# Patient Record
Sex: FEMALE | Race: WHITE | NOT HISPANIC OR LATINO | Employment: UNEMPLOYED | URBAN - METROPOLITAN AREA
[De-identification: names, ages, dates, MRNs, and addresses within clinical notes are randomized per-mention and may not be internally consistent; named-entity substitution may affect disease eponyms.]

---

## 2018-11-14 ENCOUNTER — APPOINTMENT (OUTPATIENT)
Dept: GENERAL RADIOLOGY | Facility: HOSPITAL | Age: 58
End: 2018-11-14

## 2018-11-14 ENCOUNTER — HOSPITAL ENCOUNTER (EMERGENCY)
Facility: HOSPITAL | Age: 58
Discharge: HOME | End: 2018-11-14
Attending: EMERGENCY MEDICINE | Admitting: EMERGENCY MEDICINE

## 2018-11-14 VITALS
DIASTOLIC BLOOD PRESSURE: 77 MMHG | TEMPERATURE: 97.7 F | SYSTOLIC BLOOD PRESSURE: 110 MMHG | WEIGHT: 153.6 LBS | BODY MASS INDEX: 28.26 KG/M2 | HEIGHT: 62 IN | RESPIRATION RATE: 18 BRPM | OXYGEN SATURATION: 100 % | HEART RATE: 84 BPM

## 2018-11-14 DIAGNOSIS — R55 NEAR SYNCOPE: Primary | ICD-10-CM

## 2018-11-14 LAB
ALBUMIN SERPL-MCNC: 4 G/DL (ref 3.5–5.2)
ALBUMIN/GLOB SERPL: 1.2 G/DL
ALP SERPL-CCNC: 66 U/L (ref 39–117)
ALT SERPL W P-5'-P-CCNC: 40 U/L (ref 1–33)
ANION GAP SERPL CALCULATED.3IONS-SCNC: 12.5 MMOL/L
AST SERPL-CCNC: 28 U/L (ref 1–32)
BASOPHILS # BLD AUTO: 0.03 10*3/MM3 (ref 0–0.2)
BASOPHILS NFR BLD AUTO: 0.3 % (ref 0–1.5)
BILIRUB SERPL-MCNC: 0.4 MG/DL (ref 0.1–1.2)
BUN BLD-MCNC: 17 MG/DL (ref 6–20)
BUN/CREAT SERPL: 14.5 (ref 7–25)
CALCIUM SPEC-SCNC: 9.7 MG/DL (ref 8.6–10.5)
CHLORIDE SERPL-SCNC: 102 MMOL/L (ref 98–107)
CO2 SERPL-SCNC: 22.5 MMOL/L (ref 22–29)
CREAT BLD-MCNC: 1.17 MG/DL (ref 0.57–1)
D DIMER PPP FEU-MCNC: 0.3 MCGFEU/ML (ref 0–0.49)
DEPRECATED RDW RBC AUTO: 47.4 FL (ref 37–54)
EOSINOPHIL # BLD AUTO: 0.07 10*3/MM3 (ref 0–0.7)
EOSINOPHIL NFR BLD AUTO: 0.6 % (ref 0.3–6.2)
ERYTHROCYTE [DISTWIDTH] IN BLOOD BY AUTOMATED COUNT: 12.3 % (ref 11.7–13)
GFR SERPL CREATININE-BSD FRML MDRD: 48 ML/MIN/1.73
GLOBULIN UR ELPH-MCNC: 3.4 GM/DL
GLUCOSE BLD-MCNC: 148 MG/DL (ref 65–99)
HCT VFR BLD AUTO: 39.8 % (ref 35.6–45.5)
HGB BLD-MCNC: 13 G/DL (ref 11.9–15.5)
IMM GRANULOCYTES # BLD: 0.04 10*3/MM3 (ref 0–0.03)
IMM GRANULOCYTES NFR BLD: 0.4 % (ref 0–0.5)
LYMPHOCYTES # BLD AUTO: 1.02 10*3/MM3 (ref 0.9–4.8)
LYMPHOCYTES NFR BLD AUTO: 9.2 % (ref 19.6–45.3)
MCH RBC QN AUTO: 34.3 PG (ref 26.9–32)
MCHC RBC AUTO-ENTMCNC: 32.7 G/DL (ref 32.4–36.3)
MCV RBC AUTO: 105 FL (ref 80.5–98.2)
MONOCYTES # BLD AUTO: 0.97 10*3/MM3 (ref 0.2–1.2)
MONOCYTES NFR BLD AUTO: 8.8 % (ref 5–12)
NEUTROPHILS # BLD AUTO: 8.96 10*3/MM3 (ref 1.9–8.1)
NEUTROPHILS NFR BLD AUTO: 81.1 % (ref 42.7–76)
PLATELET # BLD AUTO: 174 10*3/MM3 (ref 140–500)
PMV BLD AUTO: 11.9 FL (ref 6–12)
POTASSIUM BLD-SCNC: 3.9 MMOL/L (ref 3.5–5.2)
PROT SERPL-MCNC: 7.4 G/DL (ref 6–8.5)
RBC # BLD AUTO: 3.79 10*6/MM3 (ref 3.9–5.2)
SODIUM BLD-SCNC: 137 MMOL/L (ref 136–145)
TROPONIN T SERPL-MCNC: <0.01 NG/ML (ref 0–0.03)
WBC NRBC COR # BLD: 11.05 10*3/MM3 (ref 4.5–10.7)

## 2018-11-14 PROCEDURE — 96360 HYDRATION IV INFUSION INIT: CPT

## 2018-11-14 PROCEDURE — 84484 ASSAY OF TROPONIN QUANT: CPT | Performed by: EMERGENCY MEDICINE

## 2018-11-14 PROCEDURE — 93005 ELECTROCARDIOGRAM TRACING: CPT | Performed by: EMERGENCY MEDICINE

## 2018-11-14 PROCEDURE — 99284 EMERGENCY DEPT VISIT MOD MDM: CPT

## 2018-11-14 PROCEDURE — 85379 FIBRIN DEGRADATION QUANT: CPT | Performed by: EMERGENCY MEDICINE

## 2018-11-14 PROCEDURE — 36415 COLL VENOUS BLD VENIPUNCTURE: CPT

## 2018-11-14 PROCEDURE — 85025 COMPLETE CBC W/AUTO DIFF WBC: CPT | Performed by: EMERGENCY MEDICINE

## 2018-11-14 PROCEDURE — 80053 COMPREHEN METABOLIC PANEL: CPT | Performed by: EMERGENCY MEDICINE

## 2018-11-14 PROCEDURE — 71045 X-RAY EXAM CHEST 1 VIEW: CPT

## 2018-11-14 PROCEDURE — 93010 ELECTROCARDIOGRAM REPORT: CPT | Performed by: INTERNAL MEDICINE

## 2018-11-14 RX ADMIN — SODIUM CHLORIDE 1000 ML: 9 INJECTION, SOLUTION INTRAVENOUS at 12:56

## 2018-11-14 NOTE — ED PROVIDER NOTES
" EMERGENCY DEPARTMENT ENCOUNTER    Room Number:  16/16  PCP: Fredy Del Cid MD  Historian: Patient, Family      HPI  Chief Complaint: Near-Syncope  Context: Subha Gilbetr is a 58 y.o. female. Pt reports that about 3 weeks ago, pt sustained a torn meniscus of the left knee for which pt is currently being followed by an orthopedist at [facility] and has undergone steroid injections. Pt states that due to her knee injury, pt has been somewhat immobile for the past several weeks. Pt states that she returned to work today. While pt was at work at about 11:45 AM today, pt had a near-syncopal episode in which pt became lightheaded, diaphoretic, and nauseated. Consequently, pt was escorted to a seat by a co-worker and shortly thereafter, pt began vomiting and \"kind of blacked out\". Pt reports that after she vomited, pt's lightheadedness and diaphoresis began to improve and have now resolved. Pt denies sustaining any significant injuries during the near-syncopal episode (including head injury). Pt also denies focal weakness/numbness, speech/visual difficulties, chest pain, dyspnea, palpitations, abdominal pain, headache, and bloody stools. There are no other complaints at this time.         Location: N/A  Radiation: N/A  Character: \"I kind of blacked out\"  Duration: Onset at about 11:45 AM today  Severity: Moderate  Progression: Resolved  Aggravating Factors: Nothing  Alleviating Factors: Nothing      PAST MEDICAL HISTORY  Active Ambulatory Problems     Diagnosis Date Noted   • No Active Ambulatory Problems     Resolved Ambulatory Problems     Diagnosis Date Noted   • No Resolved Ambulatory Problems     No Additional Past Medical History         PAST SURGICAL HISTORY  Past Surgical History:   Procedure Laterality Date   • KNEE SURGERY           FAMILY HISTORY  History reviewed. No pertinent family history.      SOCIAL HISTORY  Social History     Socioeconomic History   • Marital status:      Spouse name: " Not on file   • Number of children: Not on file   • Years of education: Not on file   • Highest education level: Not on file   Social Needs   • Financial resource strain: Not on file   • Food insecurity - worry: Not on file   • Food insecurity - inability: Not on file   • Transportation needs - medical: Not on file   • Transportation needs - non-medical: Not on file   Occupational History   • Not on file   Tobacco Use   • Smoking status: Not on file   Substance and Sexual Activity   • Alcohol use: Not on file   • Drug use: Not on file   • Sexual activity: Not on file   Other Topics Concern   • Not on file   Social History Narrative   • Not on file         ALLERGIES  Penicillins        REVIEW OF SYSTEMS  Review of Systems   Constitutional: Positive for diaphoresis. Negative for chills.   HENT: Negative for congestion, rhinorrhea and sore throat.    Eyes: Negative for pain and visual disturbance.   Respiratory: Negative for cough and shortness of breath.    Cardiovascular: Negative for chest pain, palpitations and leg swelling.   Gastrointestinal: Positive for nausea and vomiting. Negative for abdominal pain, blood in stool and diarrhea.   Genitourinary: Negative for difficulty urinating, dysuria, flank pain and frequency.   Musculoskeletal: Negative for myalgias, neck pain and neck stiffness.   Skin: Negative for rash.   Neurological: Positive for light-headedness. Negative for speech difficulty, weakness, numbness and headaches.   Psychiatric/Behavioral: Negative.    All other systems reviewed and are negative.           PHYSICAL EXAM  ED Triage Vitals [11/14/18 1216]   Temp Heart Rate Resp BP SpO2   96.5 °F (35.8 °C) 102 24 (!) 82/62 93 %      Temp src Heart Rate Source Patient Position BP Location FiO2 (%)   Tympanic -- -- -- --       Physical Exam   Constitutional: She is oriented to person, place, and time. No distress.   HENT:   Head: Normocephalic.   Mouth/Throat: Mucous membranes are normal.   Eyes: EOM are  normal. Pupils are equal, round, and reactive to light.   Neck: Normal range of motion. Neck supple.   Cardiovascular: Normal rate, regular rhythm and normal heart sounds.   Pulmonary/Chest: Effort normal and breath sounds normal. No respiratory distress. She has no decreased breath sounds. She has no wheezes. She has no rhonchi. She has no rales.   Abdominal: Soft. There is no tenderness. There is no rebound and no guarding.   Musculoskeletal: She exhibits tenderness (of the left knee).   Calves are nontender bilaterally.    Neurological: She is alert and oriented to person, place, and time. She has normal sensation.   Skin: Skin is warm and dry.   Psychiatric: Mood and affect normal.   Nursing note and vitals reviewed.          LAB RESULTS  Recent Results (from the past 24 hour(s))   D-dimer, Quantitative    Collection Time: 11/14/18 12:58 PM   Result Value Ref Range    D-Dimer, Quantitative 0.30 0.00 - 0.49 MCGFEU/mL   Comprehensive Metabolic Panel    Collection Time: 11/14/18  1:19 PM   Result Value Ref Range    Glucose 148 (H) 65 - 99 mg/dL    BUN 17 6 - 20 mg/dL    Creatinine 1.17 (H) 0.57 - 1.00 mg/dL    Sodium 137 136 - 145 mmol/L    Potassium 3.9 3.5 - 5.2 mmol/L    Chloride 102 98 - 107 mmol/L    CO2 22.5 22.0 - 29.0 mmol/L    Calcium 9.7 8.6 - 10.5 mg/dL    Total Protein 7.4 6.0 - 8.5 g/dL    Albumin 4.00 3.50 - 5.20 g/dL    ALT (SGPT) 40 (H) 1 - 33 U/L    AST (SGOT) 28 1 - 32 U/L    Alkaline Phosphatase 66 39 - 117 U/L    Total Bilirubin 0.4 0.1 - 1.2 mg/dL    eGFR Non African Amer 48 (L) >60 mL/min/1.73    Globulin 3.4 gm/dL    A/G Ratio 1.2 g/dL    BUN/Creatinine Ratio 14.5 7.0 - 25.0    Anion Gap 12.5 mmol/L   Troponin    Collection Time: 11/14/18  1:19 PM   Result Value Ref Range    Troponin T <0.010 0.000 - 0.030 ng/mL   CBC Auto Differential    Collection Time: 11/14/18  1:19 PM   Result Value Ref Range    WBC 11.05 (H) 4.50 - 10.70 10*3/mm3    RBC 3.79 (L) 3.90 - 5.20 10*6/mm3    Hemoglobin 13.0  11.9 - 15.5 g/dL    Hematocrit 39.8 35.6 - 45.5 %    .0 (H) 80.5 - 98.2 fL    MCH 34.3 (H) 26.9 - 32.0 pg    MCHC 32.7 32.4 - 36.3 g/dL    RDW 12.3 11.7 - 13.0 %    RDW-SD 47.4 37.0 - 54.0 fl    MPV 11.9 6.0 - 12.0 fL    Platelets 174 140 - 500 10*3/mm3    Neutrophil % 81.1 (H) 42.7 - 76.0 %    Lymphocyte % 9.2 (L) 19.6 - 45.3 %    Monocyte % 8.8 5.0 - 12.0 %    Eosinophil % 0.6 0.3 - 6.2 %    Basophil % 0.3 0.0 - 1.5 %    Immature Grans % 0.4 0.0 - 0.5 %    Neutrophils, Absolute 8.96 (H) 1.90 - 8.10 10*3/mm3    Lymphocytes, Absolute 1.02 0.90 - 4.80 10*3/mm3    Monocytes, Absolute 0.97 0.20 - 1.20 10*3/mm3    Eosinophils, Absolute 0.07 0.00 - 0.70 10*3/mm3    Basophils, Absolute 0.03 0.00 - 0.20 10*3/mm3    Immature Grans, Absolute 0.04 (H) 0.00 - 0.03 10*3/mm3       Ordered the above labs and reviewed the results.        RADIOLOGY  XR Chest 1 View   Preliminary Result   No acute process.               Ordered the above noted radiological studies. Reviewed by me in PACS.          PROCEDURES  Procedures      EKG:          EKG time: 13:41  Rhythm/Rate: NSR rate 82  P waves and MO: Normal P waves  QRS, axis: Normal QRS   ST and T waves: Flattened T waves diffusely     Interpreted Contemporaneously by me, independently viewed  No old EKG is available for comparison           MEDICATIONS GIVEN IN ER  Medications   sodium chloride 0.9 % bolus 1,000 mL (1,000 mL Intravenous New Bag 11/14/18 1256)             PROGRESS AND CONSULTS  ED Course as of Nov 14 1452   Wed Nov 14, 2018   1434 2:34 PM  Patient here for near syncope. Sounds vasovagal.  She has been inactive for a few weeks and then was at work and felt lightheaded, nauseated, broke out into a sweat and then became cold and clammy.  States she feels fine now.  Ambulates without difficulty.  Discussed options with patient and she would like to go home.  Understands we cannot test for all causes of syncope here.    [SL]      ED Course User Index  [SL] Chyna  MD Edin        12:39 PM:  Blood work, D-Dimer, CXR, and EKG ordered for further evaluation.     12:42 PM:  IV fluids ordered to hydrate pt.     2:28 PM:  Rechecked pt. Pt's BP is currently 104/77. Per tech, pt ambulated in the ER without significant difficulty. Informed pt that her troponin is negative. D-Dimer is 0.30. Hgb is 13. Suspect that pt may have had a vasovagal near-syncopal/syncopal episode. Pt was informed of the limitations of the studies ordered in the ER. We cannot r/o all possible causes of pt's (near)-syncope in the ER. Pt reports that she understands this and would like to f/u as an outpatient for her symptoms. Pt was strongly advised to f/u with PMD. Strict RTER warnings given. Pt agrees with plan for discharge.         MEDICAL DECISION MAKING      MDM  Number of Diagnoses or Management Options     Amount and/or Complexity of Data Reviewed  Clinical lab tests: reviewed and ordered (Troponin is negative. D-Dimer is 0.30. )  Tests in the radiology section of CPT®: ordered and reviewed (CXR:  No acute process.)  Tests in the medicine section of CPT®: reviewed and ordered (EKG interpreted.   )    Patient Progress  Patient progress: stable             DIAGNOSIS  Final diagnoses:   Near syncope           DISPOSITION  Pt discharged.    DISCHARGE    Patient discharged in stable condition.    Reviewed implications of results, diagnosis, meds, responsibility to follow up, warning signs and symptoms of possible worsening, potential complications and reasons to return to ER.    Patient/Family voiced understanding of above instructions.    Discussed plan for discharge, as there is no emergent indication for admission. Pt/family is agreeable and understands need for follow up and repeat testing. Pt is aware that discharge does not mean that nothing is wrong but it indicates no emergency is present that requires admission and they must continue care with follow-up as given below or physician of their choice.      FOLLOW-UP  Fredy Del Cid MD  [address] [facility] [address]  [phone]    Schedule an appointment as soon as possible for a visit           Latest Documented Vital Signs:  As of 2:51 PM  BP- 104/77 HR- 80 Temp- 96.5 °F (35.8 °C) (Tympanic) O2 sat- 100%        --  Documentation assistance provided by vera Candelario for Dr. Chyna MD.  Information recorded by the scribe was done at my direction and has been verified and validated by me.           Rufina Candelario  11/14/18 [id number]       Edin Clemente MD  11/14/18 [id number]

## 2021-06-18 ENCOUNTER — APPOINTMENT (OUTPATIENT)
Dept: GENERAL RADIOLOGY | Facility: HOSPITAL | Age: 61
End: 2021-06-18

## 2021-06-18 ENCOUNTER — HOSPITAL ENCOUNTER (OUTPATIENT)
Facility: HOSPITAL | Age: 61
Setting detail: OBSERVATION
Discharge: HOME | End: 2021-06-20
Attending: EMERGENCY MEDICINE | Admitting: HOSPITALIST

## 2021-06-18 ENCOUNTER — APPOINTMENT (OUTPATIENT)
Dept: CT IMAGING | Facility: HOSPITAL | Age: 61
End: 2021-06-18

## 2021-06-18 DIAGNOSIS — E87.6 HYPOKALEMIA: ICD-10-CM

## 2021-06-18 DIAGNOSIS — R94.31 PROLONGED Q-T INTERVAL ON ECG: ICD-10-CM

## 2021-06-18 DIAGNOSIS — R55 SYNCOPE AND COLLAPSE: Primary | ICD-10-CM

## 2021-06-18 LAB
ALBUMIN SERPL-MCNC: 4.5 G/DL (ref 3.5–5.2)
ALBUMIN/GLOB SERPL: 1.3 G/DL
ALP SERPL-CCNC: 74 U/L (ref 39–117)
ALT SERPL W P-5'-P-CCNC: 48 U/L (ref 1–33)
ANION GAP SERPL CALCULATED.3IONS-SCNC: 18.9 MMOL/L (ref 5–15)
AST SERPL-CCNC: 57 U/L (ref 1–32)
BASOPHILS # BLD AUTO: 0.07 10*3/MM3 (ref 0–0.2)
BASOPHILS NFR BLD AUTO: 0.7 % (ref 0–1.5)
BILIRUB SERPL-MCNC: 0.8 MG/DL (ref 0–1.2)
BUN SERPL-MCNC: 17 MG/DL (ref 8–23)
BUN/CREAT SERPL: 15.2 (ref 7–25)
CALCIUM SPEC-SCNC: 9.7 MG/DL (ref 8.6–10.5)
CHLORIDE SERPL-SCNC: 100 MMOL/L (ref 98–107)
CO2 SERPL-SCNC: 19.1 MMOL/L (ref 22–29)
CREAT SERPL-MCNC: 1.12 MG/DL (ref 0.57–1)
DEPRECATED RDW RBC AUTO: 46.9 FL (ref 37–54)
EOSINOPHIL # BLD AUTO: 0.1 10*3/MM3 (ref 0–0.4)
EOSINOPHIL NFR BLD AUTO: 1.1 % (ref 0.3–6.2)
ERYTHROCYTE [DISTWIDTH] IN BLOOD BY AUTOMATED COUNT: 12.6 % (ref 12.3–15.4)
GFR SERPL CREATININE-BSD FRML MDRD: 50 ML/MIN/1.73
GLOBULIN UR ELPH-MCNC: 3.4 GM/DL
GLUCOSE BLDC GLUCOMTR-MCNC: 174 MG/DL (ref 70–130)
GLUCOSE SERPL-MCNC: 177 MG/DL (ref 65–99)
HCT VFR BLD AUTO: 37.7 % (ref 34–46.6)
HGB BLD-MCNC: 12.6 G/DL (ref 12–15.9)
IMM GRANULOCYTES # BLD AUTO: 0.04 10*3/MM3 (ref 0–0.05)
IMM GRANULOCYTES NFR BLD AUTO: 0.4 % (ref 0–0.5)
LYMPHOCYTES # BLD AUTO: 2.03 10*3/MM3 (ref 0.7–3.1)
LYMPHOCYTES NFR BLD AUTO: 21.6 % (ref 19.6–45.3)
MAGNESIUM SERPL-MCNC: 1.6 MG/DL (ref 1.6–2.4)
MCH RBC QN AUTO: 33.6 PG (ref 26.6–33)
MCHC RBC AUTO-ENTMCNC: 33.4 G/DL (ref 31.5–35.7)
MCV RBC AUTO: 100.5 FL (ref 79–97)
MONOCYTES # BLD AUTO: 0.78 10*3/MM3 (ref 0.1–0.9)
MONOCYTES NFR BLD AUTO: 8.3 % (ref 5–12)
NEUTROPHILS NFR BLD AUTO: 6.38 10*3/MM3 (ref 1.7–7)
NEUTROPHILS NFR BLD AUTO: 67.9 % (ref 42.7–76)
NRBC BLD AUTO-RTO: 0 /100 WBC (ref 0–0.2)
NT-PROBNP SERPL-MCNC: 184.5 PG/ML (ref 0–900)
PLATELET # BLD AUTO: 106 10*3/MM3 (ref 140–450)
PMV BLD AUTO: 12 FL (ref 6–12)
POTASSIUM SERPL-SCNC: 3 MMOL/L (ref 3.5–5.2)
PROT SERPL-MCNC: 7.9 G/DL (ref 6–8.5)
RBC # BLD AUTO: 3.75 10*6/MM3 (ref 3.77–5.28)
SARS-COV-2 RNA RESP QL NAA+PROBE: NOT DETECTED
SODIUM SERPL-SCNC: 138 MMOL/L (ref 136–145)
TROPONIN T SERPL-MCNC: <0.01 NG/ML (ref 0–0.03)
WBC # BLD AUTO: 9.4 10*3/MM3 (ref 3.4–10.8)

## 2021-06-18 PROCEDURE — U0003 INFECTIOUS AGENT DETECTION BY NUCLEIC ACID (DNA OR RNA); SEVERE ACUTE RESPIRATORY SYNDROME CORONAVIRUS 2 (SARS-COV-2) (CORONAVIRUS DISEASE [COVID-19]), AMPLIFIED PROBE TECHNIQUE, MAKING USE OF HIGH THROUGHPUT TECHNOLOGIES AS DESCRIBED BY CMS-2020-01-R: HCPCS | Performed by: EMERGENCY MEDICINE

## 2021-06-18 PROCEDURE — 96361 HYDRATE IV INFUSION ADD-ON: CPT

## 2021-06-18 PROCEDURE — 93005 ELECTROCARDIOGRAM TRACING: CPT | Performed by: EMERGENCY MEDICINE

## 2021-06-18 PROCEDURE — 71045 X-RAY EXAM CHEST 1 VIEW: CPT

## 2021-06-18 PROCEDURE — 83880 ASSAY OF NATRIURETIC PEPTIDE: CPT | Performed by: EMERGENCY MEDICINE

## 2021-06-18 PROCEDURE — G0378 HOSPITAL OBSERVATION PER HR: HCPCS

## 2021-06-18 PROCEDURE — 96365 THER/PROPH/DIAG IV INF INIT: CPT

## 2021-06-18 PROCEDURE — 84484 ASSAY OF TROPONIN QUANT: CPT | Performed by: EMERGENCY MEDICINE

## 2021-06-18 PROCEDURE — 85025 COMPLETE CBC W/AUTO DIFF WBC: CPT | Performed by: EMERGENCY MEDICINE

## 2021-06-18 PROCEDURE — 25010000002 MAGNESIUM SULFATE 2 GM/50ML SOLUTION: Performed by: EMERGENCY MEDICINE

## 2021-06-18 PROCEDURE — 99285 EMERGENCY DEPT VISIT HI MDM: CPT

## 2021-06-18 PROCEDURE — 93010 ELECTROCARDIOGRAM REPORT: CPT | Performed by: INTERNAL MEDICINE

## 2021-06-18 PROCEDURE — 83735 ASSAY OF MAGNESIUM: CPT | Performed by: EMERGENCY MEDICINE

## 2021-06-18 PROCEDURE — 70450 CT HEAD/BRAIN W/O DYE: CPT

## 2021-06-18 PROCEDURE — 80053 COMPREHEN METABOLIC PANEL: CPT | Performed by: EMERGENCY MEDICINE

## 2021-06-18 PROCEDURE — 25810000003 SODIUM CHLORIDE 0.9 % WITH KCL 20 MEQ 20-0.9 MEQ/L-% SOLUTION: Performed by: HOSPITALIST

## 2021-06-18 PROCEDURE — C9803 HOPD COVID-19 SPEC COLLECT: HCPCS

## 2021-06-18 PROCEDURE — 82962 GLUCOSE BLOOD TEST: CPT

## 2021-06-18 RX ORDER — POTASSIUM CHLORIDE 750 MG/1
40 TABLET, FILM COATED, EXTENDED RELEASE ORAL ONCE
Status: DISCONTINUED | OUTPATIENT
Start: 2021-06-18 | End: 2021-06-18

## 2021-06-18 RX ORDER — PANTOPRAZOLE SODIUM 40 MG/1
40 TABLET, DELAYED RELEASE ORAL
Status: DISCONTINUED | OUTPATIENT
Start: 2021-06-19 | End: 2021-06-20 | Stop reason: HOSPADM

## 2021-06-18 RX ORDER — ATORVASTATIN CALCIUM 20 MG/1
40 TABLET, FILM COATED ORAL NIGHTLY
Status: DISCONTINUED | OUTPATIENT
Start: 2021-06-18 | End: 2021-06-20 | Stop reason: HOSPADM

## 2021-06-18 RX ORDER — SODIUM CHLORIDE 0.9 % (FLUSH) 0.9 %
10 SYRINGE (ML) INJECTION AS NEEDED
Status: DISCONTINUED | OUTPATIENT
Start: 2021-06-18 | End: 2021-06-20 | Stop reason: HOSPADM

## 2021-06-18 RX ORDER — ASPIRIN 81 MG/1
81 TABLET, CHEWABLE ORAL DAILY
Status: DISCONTINUED | OUTPATIENT
Start: 2021-06-18 | End: 2021-06-20 | Stop reason: HOSPADM

## 2021-06-18 RX ORDER — MAGNESIUM SULFATE HEPTAHYDRATE 40 MG/ML
2 INJECTION, SOLUTION INTRAVENOUS ONCE
Status: DISCONTINUED | OUTPATIENT
Start: 2021-06-18 | End: 2021-06-18

## 2021-06-18 RX ORDER — ACETAMINOPHEN 325 MG/1
650 TABLET ORAL EVERY 4 HOURS PRN
Status: DISCONTINUED | OUTPATIENT
Start: 2021-06-18 | End: 2021-06-20

## 2021-06-18 RX ORDER — SODIUM CHLORIDE AND POTASSIUM CHLORIDE 150; 900 MG/100ML; MG/100ML
75 INJECTION, SOLUTION INTRAVENOUS CONTINUOUS
Status: DISCONTINUED | OUTPATIENT
Start: 2021-06-18 | End: 2021-06-20

## 2021-06-18 RX ORDER — POTASSIUM CHLORIDE 750 MG/1
40 TABLET, FILM COATED, EXTENDED RELEASE ORAL ONCE
Status: COMPLETED | OUTPATIENT
Start: 2021-06-18 | End: 2021-06-18

## 2021-06-18 RX ORDER — MAGNESIUM SULFATE HEPTAHYDRATE 40 MG/ML
2 INJECTION, SOLUTION INTRAVENOUS ONCE
Status: COMPLETED | OUTPATIENT
Start: 2021-06-18 | End: 2021-06-18

## 2021-06-18 RX ADMIN — ASPIRIN 81 MG: 81 TABLET, CHEWABLE ORAL at 21:49

## 2021-06-18 RX ADMIN — ATORVASTATIN CALCIUM 40 MG: 20 TABLET, FILM COATED ORAL at 21:49

## 2021-06-18 RX ADMIN — POTASSIUM CHLORIDE AND SODIUM CHLORIDE 125 ML/HR: 900; 150 INJECTION, SOLUTION INTRAVENOUS at 18:26

## 2021-06-18 RX ADMIN — MAGNESIUM SULFATE HEPTAHYDRATE 2 G: 2 INJECTION, SOLUTION INTRAVENOUS at 14:07

## 2021-06-18 RX ADMIN — SODIUM CHLORIDE, POTASSIUM CHLORIDE, SODIUM LACTATE AND CALCIUM CHLORIDE 1000 ML: 600; 310; 30; 20 INJECTION, SOLUTION INTRAVENOUS at 12:03

## 2021-06-18 RX ADMIN — POTASSIUM CHLORIDE 40 MEQ: 750 TABLET, EXTENDED RELEASE ORAL at 14:08

## 2021-06-18 NOTE — H&P
"History and physical    Primary care physician  Dr. CHARLES    Chief complaint  Syncopal episode    History of present illness  60-year-old white female with no medical problem home medication brought to the emergency room after syncopal episode while she was at work. Patient was feeling dizzy and lightheaded but no other complaints. Patient does not recall what happened. At the time of interview she is fully alert oriented with some headache as she hit her head on the left side and back. Patient has no loss of control of bowel bladder or tongue biting. Patient has no focal weakness but looks puffy on the left face. Patient admitted for management.    PAST MEDICAL HISTORY   Unremarkable      PAST SURGICAL HISTORY              Procedure Laterality Date   • KNEE SURGERY             FAMILY HISTORY  No family history on file.     SOCIAL HISTORY                 Socioeconomic History   • Marital status:        Spouse name: Not on file   • Number of children: Not on file   • Years of education: Not on file   • Highest education level: Not on file         ALLERGIES  Penicillins  No home medications     REVIEW OF SYSTEMS  Review of all 14 systems is negative other than stated in the HPI above.     PHYSICAL EXAM  Blood pressure 115/75, pulse 74, temperature 98.6 °F (37 °C), temperature source Oral, resp. rate 16, height 157.5 cm (62\"), weight 73.4 kg (161 lb 14.4 oz), SpO2 96 %.    GENERAL: Awake and alert, no acute distress  HENT: nares patent, no palpable skull fracture or scalp hematoma  EYES: no scleral icterus, pupils 3 mm reactive bilaterally, EOMI  CV: regular rhythm, normal rate, no murmur  RESPIRATORY: normal effort, lungs clear auscultation bilaterally  ABDOMEN: soft, nondistended, nontender throughout  MUSCULOSKELETAL: no deformity, no point tenderness throughout bilateral upper or lower extremities  NEURO: alert, fully oriented, moves all extremities, follows commands.  Normal strength in bilateral upper " extremities with no drift.  Normal strength in bilateral lower extremities.  Normal sensation to light touch throughout all extremities.  Speech is fluent.  PSYCH:  calm, cooperative  SKIN: warm, dry     LAB RESULTS  Lab Results (last 24 hours)     Procedure Component Value Units Date/Time    COVID PRE-OP / PRE-PROCEDURE SCREENING ORDER (NO ISOLATION) - Swab, Nasopharynx [[id number]]  (Normal) Collected: 06/18/21 1409    Specimen: Swab from Nasopharynx Updated: 06/18/21 1537    Narrative:      The following orders were created for panel order COVID PRE-OP / PRE-PROCEDURE SCREENING ORDER (NO ISOLATION) - Swab, Nasopharynx.  Procedure                               Abnormality         Status                     ---------                               -----------         ------                     COVID-19,[facility]-HOUSE...[[id number]]  Normal              Final result                 Please view results for these tests on the individual orders.    COVID-19,[facility] CEPHEID/JOSÉ MIGUEL NP SWAB IN TRANSPORT MEDIA 8-12 HR TAT - Swab, Nasopharynx [[id number]]  (Normal) Collected: 06/18/21 1409    Specimen: Swab from Nasopharynx Updated: 06/18/21 1537     COVID19 Not Detected    Narrative:      Fact sheet for providers: https://www.fda.gov/media/527889/download     Fact sheet for patients: https://www.fda.gov/media/804135/download    Magnesium [[id number]]  (Normal) Collected: 06/18/21 1202    Specimen: Blood Updated: 06/18/21 1435     Magnesium 1.6 mg/dL     Comprehensive Metabolic Panel [[id number]]  (Abnormal) Collected: 06/18/21 1202    Specimen: Blood Updated: 06/18/21 1313     Glucose 177 mg/dL      BUN 17 mg/dL      Creatinine 1.12 mg/dL      Sodium 138 mmol/L      Potassium 3.0 mmol/L      Chloride 100 mmol/L      CO2 19.1 mmol/L      Calcium 9.7 mg/dL      Total Protein 7.9 g/dL      Albumin 4.50 g/dL      ALT (SGPT) 48 U/L      AST (SGOT) 57 U/L      Alkaline Phosphatase 74 U/L      Total Bilirubin 0.8 mg/dL      eGFR  Non African Amer 50 mL/min/1.73      Globulin 3.4 gm/dL      A/G Ratio 1.3 g/dL      BUN/Creatinine Ratio 15.2     Anion Gap 18.9 mmol/L     Narrative:      GFR Normal >60  Chronic Kidney Disease <60  Kidney Failure <15      Troponin [[id number]]  (Normal) Collected: 06/18/21 1202    Specimen: Blood Updated: 06/18/21 1250     Troponin T <0.010 ng/mL     Narrative:      Troponin T Reference Range:  <= 0.03 ng/mL-   Negative for AMI  >0.03 ng/mL-     Abnormal for myocardial necrosis.  Clinicians would have to utilize clinical acumen, EKG, Troponin and serial changes to determine if it is an Acute Myocardial Infarction or myocardial injury due to an underlying chronic condition.       Results may be falsely decreased if patient taking Biotin.      BNP [[id number]]  (Normal) Collected: 06/18/21 1202    Specimen: Blood Updated: 06/18/21 1247     proBNP 184.5 pg/mL     Narrative:      Among patients with dyspnea, NT-proBNP is highly sensitive for the detection of acute congestive heart failure. In addition NT-proBNP of <300 pg/ml effectively rules out acute congestive heart failure with 99% negative predictive value.    Results may be falsely decreased if patient taking Biotin.      CBC & Differential [[id number]]  (Abnormal) Collected: 06/18/21 1202    Specimen: Blood Updated: 06/18/21 1224    Narrative:      The following orders were created for panel order CBC & Differential.  Procedure                               Abnormality         Status                     ---------                               -----------         ------                     CBC Auto Differential[[id number]]        Abnormal            Final result                 Please view results for these tests on the individual orders.    CBC Auto Differential [[id number]]  (Abnormal) Collected: 06/18/21 1202    Specimen: Blood Updated: 06/18/21 1224     WBC 9.40 10*3/mm3      RBC 3.75 10*6/mm3      Hemoglobin 12.6 g/dL      Hematocrit 37.7 %      .5 fL       MCH 33.6 pg      MCHC 33.4 g/dL      RDW 12.6 %      RDW-SD 46.9 fl      MPV 12.0 fL      Platelets 106 10*3/mm3      Neutrophil % 67.9 %      Lymphocyte % 21.6 %      Monocyte % 8.3 %      Eosinophil % 1.1 %      Basophil % 0.7 %      Immature Grans % 0.4 %      Neutrophils, Absolute 6.38 10*3/mm3      Lymphocytes, Absolute 2.03 10*3/mm3      Monocytes, Absolute 0.78 10*3/mm3      Eosinophils, Absolute 0.10 10*3/mm3      Basophils, Absolute 0.07 10*3/mm3      Immature Grans, Absolute 0.04 10*3/mm3      nRBC 0.0 /100 WBC     POC Glucose Once [[id number]]  (Abnormal) Collected: 06/18/21 1159    Specimen: Blood Updated: 06/18/21 1200     Glucose 174 mg/dL         Imaging Results (Last 24 Hours)     Procedure Component Value Units Date/Time    CT Head Without Contrast [[id number]] Collected: 06/18/21 1231     Updated: 06/18/21 1307    Narrative:      CT HEAD WO CONTRAST-     CLINICAL HISTORY: Syncope. Left occipital head trauma.     TECHNIQUE: Transverse 3 mm thick images were obtained from the base of  the skull to the vertex without IV contrast.     Radiation dose reduction techniques were utilized, including automated  exposure control and exposure modulation based on body size.     COMPARISON: None     FINDINGS: There is mild diffuse cortical atrophy that is most prominent  in the frontal lobes. There is patchy ill-defined diminished attenuation  in the periventricular white matter of both cerebral hemispheres that is  most likely sequela of mild to moderate small vessel chronic ischemic  change. There is no evidence of acute infarct or hemorrhage. There is no  mass effect. Bone window images demonstrate no skull fractures. The  paranasal sinuses and mastoid air cells and middle ear cavities appear  well aerated.       Impression:      Mild cortical atrophy and evidence of mild to moderate small  vessel chronic ischemic change as described. No acute intracranial  abnormality is identified.     This report was  finalized on 6/18/2021 12:35 PM by Dr. Luis Armando Breaux M.D.       XR Chest 1 View [[id number]] Collected: 06/18/21 1241     Updated: 06/18/21 1245    Narrative:      XR CHEST 1 VW-     HISTORY: Female who is 60 years-old,  syncope     TECHNIQUE: Frontal view of the chest     COMPARISON: 11/14/2018     FINDINGS: Heart, mediastinum and pulmonary vasculature are unremarkable.  No focal pulmonary consolidation, pleural effusion, or pneumothorax. No  acute osseous process.       Impression:      No evidence for acute pulmonary process. Follow-up as  clinical indications persist.     This report was finalized on 6/18/2021 12:42 PM by Dr. Chapincito Santos M.D.              ECG 12 Lead  Component   Ref Range & Units 6/18/21 1237   QT Interval   ms 451 P           HEART RATE= 71  bpm  RR Interval= 844  ms  WA Interval= 159  ms  P Horizontal Axis= 17  deg  P Front Axis= 48  deg  QRSD Interval= 96  ms  QT Interval= 451  ms  QRS Axis= -23  deg  T Wave Axis= 26  deg  - BORDERLINE ECG -  Sinus rhythm  Borderline left axis deviation  Borderline prolonged QT interval               Current Facility-Administered Medications:   •  [COMPLETED] Insert peripheral IV, , , Once **AND** sodium chloride 0.9 % flush 10 mL, 10 mL, Intravenous, PRN, Pedro Judd MD  •  sodium chloride 0.9 % with KCl 20 mEq/L infusion, 75 mL/hr, Intravenous, Continuous, Johnny Trammell MD  No current outpatient medications on file.     ASSESSMENT  Syncopal episode questionable etiology  Dehydration  Hypokalemia  Left facial droop rule out TIA    PLAN  Admit  IVF  Neurochecks every 4 hours  Check orthostasis  Aspirin Lipitor  Neuro consult  Cardiology to follow patient  Stress ulcer DVT prophylaxis  Supportive care  Patient is full code  Discussed with nursing staff  Follow closely with recommendation current hospital course    JOHNNY TRAMMELL MD

## 2021-06-18 NOTE — ED PROVIDER NOTES
EMERGENCY DEPARTMENT ENCOUNTER    Room Number:  12/12  Date seen:  6/18/2021  PCP: Fredy Del Cid MD  Historian: Patient, EMS      HPI:  Chief Complaint: Syncope  A complete HPI/ROS/PMH/PSH/SH/FH are unobtainable due to: Nothing  Context: Subha Gilbert is a 60 y.o. female who presents to the ED c/o syncopal episode that occurred while at work today.  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  She did hit her head on the left side and the back.  She denies headache currently but does have some mild pain over the left posterior scalp.  EMS reports that blood sugar was slightly elevated prior to arrival, normal blood pressure and heart rate.  They report that she had slightly unequal pupils initially and also appeared to have some mild left facial droop and left hand  weakness.  Patient denies history of seizure.  She denies frequent alcohol use.  She denies urinary symptoms.  She has not been feeling well the last couple of days and has had watery diarrhea.  She denies black or bloody stool.  No chest pain or shortness of air.  She takes no blood thinner medications.            PAST MEDICAL HISTORY  Active Ambulatory Problems     Diagnosis Date Noted   • No Active Ambulatory Problems     Resolved Ambulatory Problems     Diagnosis Date Noted   • No Resolved Ambulatory Problems     No Additional Past Medical History         PAST SURGICAL HISTORY  Past Surgical History:   Procedure Laterality Date   • KNEE SURGERY           FAMILY HISTORY  No family history on file.      SOCIAL HISTORY  Social History     Socioeconomic History   • Marital status:      Spouse name: Not on file   • Number of children: Not on file   • Years of education: Not on file   • Highest education level: Not on file         ALLERGIES  Penicillins        REVIEW OF SYSTEMS  Review of Systems   Review of all 14 systems is negative other than stated in the HPI  above.      PHYSICAL EXAM  ED Triage Vitals   Temp Heart Rate Resp BP SpO2   -- 06/18/21 1158 06/18/21 1158 06/18/21 1159 06/18/21 1158    77 16 115/83 97 %      Temp src Heart Rate Source Patient Position BP Location FiO2 (%)   -- -- -- -- --                GENERAL: Awake and alert, no acute distress  HENT: nares patent, no palpable skull fracture or scalp hematoma  EYES: no scleral icterus, pupils 3 mm reactive bilaterally, EOMI  CV: regular rhythm, normal rate, no murmur  RESPIRATORY: normal effort, lungs clear auscultation bilaterally  ABDOMEN: soft, nondistended, nontender throughout  MUSCULOSKELETAL: no deformity, no point tenderness throughout bilateral upper or lower extremities  NEURO: alert, fully oriented, moves all extremities, follows commands.  Normal strength in bilateral upper extremities with no drift.  Normal strength in bilateral lower extremities.  Normal sensation to light touch throughout all extremities.  Speech is fluent.  PSYCH:  calm, cooperative  SKIN: warm, dry    Vital signs and nursing notes reviewed.          LAB RESULTS  Recent Results (from the past 24 hour(s))   POC Glucose Once    Collection Time: 06/18/21 11:59 AM    Specimen: Blood   Result Value Ref Range    Glucose 174 (H) 70 - 130 mg/dL   Comprehensive Metabolic Panel    Collection Time: 06/18/21 12:02 PM    Specimen: Blood   Result Value Ref Range    Glucose 177 (H) 65 - 99 mg/dL    BUN 17 8 - 23 mg/dL    Creatinine 1.12 (H) 0.57 - 1.00 mg/dL    Sodium 138 136 - 145 mmol/L    Potassium 3.0 (L) 3.5 - 5.2 mmol/L    Chloride 100 98 - 107 mmol/L    CO2 19.1 (L) 22.0 - 29.0 mmol/L    Calcium 9.7 8.6 - 10.5 mg/dL    Total Protein 7.9 6.0 - 8.5 g/dL    Albumin 4.50 3.50 - 5.20 g/dL    ALT (SGPT) 48 (H) 1 - 33 U/L    AST (SGOT) 57 (H) 1 - 32 U/L    Alkaline Phosphatase 74 39 - 117 U/L    Total Bilirubin 0.8 0.0 - 1.2 mg/dL    eGFR Non African Amer 50 (L) >60 mL/min/1.73    Globulin 3.4 gm/dL    A/G Ratio 1.3 g/dL    BUN/Creatinine  Ratio 15.2 7.0 - 25.0    Anion Gap 18.9 (H) 5.0 - 15.0 mmol/L   BNP    Collection Time: 06/18/21 12:02 PM    Specimen: Blood   Result Value Ref Range    proBNP 184.5 0.0 - 900.0 pg/mL   Troponin    Collection Time: 06/18/21 12:02 PM    Specimen: Blood   Result Value Ref Range    Troponin T <0.010 0.000 - 0.030 ng/mL   CBC Auto Differential    Collection Time: 06/18/21 12:02 PM    Specimen: Blood   Result Value Ref Range    WBC 9.40 3.40 - 10.80 10*3/mm3    RBC 3.75 (L) 3.77 - 5.28 10*6/mm3    Hemoglobin 12.6 12.0 - 15.9 g/dL    Hematocrit 37.7 34.0 - 46.6 %    .5 (H) 79.0 - 97.0 fL    MCH 33.6 (H) 26.6 - 33.0 pg    MCHC 33.4 31.5 - 35.7 g/dL    RDW 12.6 12.3 - 15.4 %    RDW-SD 46.9 37.0 - 54.0 fl    MPV 12.0 6.0 - 12.0 fL    Platelets 106 (L) 140 - 450 10*3/mm3    Neutrophil % 67.9 42.7 - 76.0 %    Lymphocyte % 21.6 19.6 - 45.3 %    Monocyte % 8.3 5.0 - 12.0 %    Eosinophil % 1.1 0.3 - 6.2 %    Basophil % 0.7 0.0 - 1.5 %    Immature Grans % 0.4 0.0 - 0.5 %    Neutrophils, Absolute 6.38 1.70 - 7.00 10*3/mm3    Lymphocytes, Absolute 2.03 0.70 - 3.10 10*3/mm3    Monocytes, Absolute 0.78 0.10 - 0.90 10*3/mm3    Eosinophils, Absolute 0.10 0.00 - 0.40 10*3/mm3    Basophils, Absolute 0.07 0.00 - 0.20 10*3/mm3    Immature Grans, Absolute 0.04 0.00 - 0.05 10*3/mm3    nRBC 0.0 0.0 - 0.2 /100 WBC   Magnesium    Collection Time: 06/18/21 12:02 PM    Specimen: Blood   Result Value Ref Range    Magnesium 1.6 1.6 - 2.4 mg/dL   ECG 12 Lead    Collection Time: 06/18/21 12:37 PM   Result Value Ref Range    QT Interval 451 ms       Ordered the above labs and reviewed the results.        RADIOLOGY  CT Head Without Contrast    Result Date: 6/18/2021  CT HEAD WO CONTRAST-  CLINICAL HISTORY: Syncope. Left occipital head trauma.  TECHNIQUE: Transverse 3 mm thick images were obtained from the base of the skull to the vertex without IV contrast.  Radiation dose reduction techniques were utilized, including automated exposure control  and exposure modulation based on body size.  COMPARISON: None  FINDINGS: There is mild diffuse cortical atrophy that is most prominent in the frontal lobes. There is patchy ill-defined diminished attenuation in the periventricular white matter of both cerebral hemispheres that is most likely sequela of mild to moderate small vessel chronic ischemic change. There is no evidence of acute infarct or hemorrhage. There is no mass effect. Bone window images demonstrate no skull fractures. The paranasal sinuses and mastoid air cells and middle ear cavities appear well aerated.      Mild cortical atrophy and evidence of mild to moderate small vessel chronic ischemic change as described. No acute intracranial abnormality is identified.  This report was finalized on 6/18/2021 12:35 PM by Dr. Luis Armando Breaux M.D.      XR Chest 1 View    Result Date: 6/18/2021  XR CHEST 1 VW-  HISTORY: Female who is 60 years-old,  syncope  TECHNIQUE: Frontal view of the chest  COMPARISON: 11/14/2018  FINDINGS: Heart, mediastinum and pulmonary vasculature are unremarkable. No focal pulmonary consolidation, pleural effusion, or pneumothorax. No acute osseous process.      No evidence for acute pulmonary process. Follow-up as clinical indications persist.  This report was finalized on 6/18/2021 12:42 PM by Dr. Chapincito Santos M.D.        Ordered the above noted radiological studies. Reviewed by me in PACS.            PROCEDURES  Procedures            MEDICATIONS GIVEN IN ER  Medications   sodium chloride 0.9 % flush 10 mL (has no administration in time range)   lactated ringers bolus 1,000 mL (0 mL Intravenous Stopped 6/18/21 1406)   potassium chloride (K-DUR,KLOR-CON) ER tablet 40 mEq (40 mEq Oral Given 6/18/21 1408)   magnesium sulfate 2g/50 mL (PREMIX) infusion (0 g Intravenous Stopped 6/18/21 1436)                   MEDICAL DECISION MAKING, PROGRESS, and CONSULTS    All labs have been independently reviewed by me.  All radiology studies have  been reviewed by me and discussed with radiologist dictating the report.   EKG's independently viewed and interpreted by me.  Discussion below represents my analysis of pertinent findings related to patient's condition, differential diagnosis, treatment plan and final disposition.      Differential diagnosis includes but is not limited to:  Cardiac arrhythmia  Traumatic intracranial hemorrhage  Vasovagal syncope  Vertebrobasilar insufficiency  Hypovolemia secondary to diarrhea    ED Course as of Jun 18 1507   Fri Jun 18, 2021   1204 Patient with syncopal episode prior to arrival with secondary head trauma.  She currently has no focal neurologic deficit.  I have ordered CT head without contrast to evaluate for traumatic intracranial hemorrhage or skull fracture.  I suspect that she is slightly hypovolemic given initiation of new blood pressure medication on Monday and also diarrhea for the last 2 days.  I ordered IV fluids.    [JR]   1255 EKG          EKG time: 1237  Rhythm/Rate: Sinus rhythm, 71  P waves and VT: Normal  QRS, axis: Borderline left axis  ST and T waves: Borderline QT prolongation    Interpreted Contemporaneously by me, independently viewed  Similar compared to prior 11/14/2018          [JR]   1256 I discussed the CT head with Dr. Santos, radiologist who reports no acute intracranial findings.    [JR]   1334 Patient is mildly hypokalemic.  Have ordered a serum magnesium level but have also ordered 2 g magnesium sulfate empirically in addition to 40 mEq oral potassium.  Given her syncopal event with borderline QT prolongation on EKG I will admit her for further monitoring, potassium replacement.    [JR]   1442 Discussed with Dr. Trammell who agrees to admit.    [JR]      ED Course User Index  [JR] Pedro Judd MD     Patient be admitted for further cardiac monitoring given borderline QT prolongation and hypokalemia.  I think that her symptoms are most likely multifactorial and partly related to  2 days of diarrhea preceding this.  Hemoglobin is normal therefore I do not suspect GI bleed.  She has been given IV fluids.  She is beginning to feel better.         I wore a mask, face shield, and gloves during this patient encounter.  Patient also wearing a surgical mask.  Hand hygeine performed before and after seeing the patient.    DIAGNOSIS  Final diagnoses:   Syncope and collapse   Hypokalemia   Prolonged Q-T interval on ECG         DISPOSITION  ADMIT            Latest Documented Vital Signs:  As of 15:07 EDT  BP- 119/75 HR- 83 Temp-   O2 sat- 96%        --    Please note that portions of this were completed with a voice recognition program.          Pedro Judd MD  06/18/21 1276

## 2021-06-18 NOTE — ED NOTES
Patient from work with c/o syncopal episode that happened around 1100 today. Patient woke up with left sided weakness, left sided facial droop. Patient did hit her head when she passed out. No blood thinners.      Jose Alfredo, СВЕТЛАНА Wayne  06/18/21 0343

## 2021-06-18 NOTE — ED NOTES
Hand hygiene maintained before and after. Wore appropriate PPE, greeted patient and made sure needs were met w/ call light in reach.      Carmelita Hale, PCT  06/18/21 8078

## 2021-06-19 LAB
ALBUMIN SERPL-MCNC: 3.9 G/DL (ref 3.5–5.2)
ALBUMIN/GLOB SERPL: 1.6 G/DL
ALP SERPL-CCNC: 60 U/L (ref 39–117)
ALT SERPL W P-5'-P-CCNC: 35 U/L (ref 1–33)
ANION GAP SERPL CALCULATED.3IONS-SCNC: 8.8 MMOL/L (ref 5–15)
AST SERPL-CCNC: 37 U/L (ref 1–32)
BASOPHILS # BLD AUTO: 0.05 10*3/MM3 (ref 0–0.2)
BASOPHILS NFR BLD AUTO: 0.8 % (ref 0–1.5)
BILIRUB SERPL-MCNC: 0.3 MG/DL (ref 0–1.2)
BUN SERPL-MCNC: 12 MG/DL (ref 8–23)
BUN/CREAT SERPL: 17.9 (ref 7–25)
CALCIUM SPEC-SCNC: 8.7 MG/DL (ref 8.6–10.5)
CHLORIDE SERPL-SCNC: 108 MMOL/L (ref 98–107)
CHOLEST SERPL-MCNC: 210 MG/DL (ref 0–200)
CO2 SERPL-SCNC: 21.2 MMOL/L (ref 22–29)
CREAT SERPL-MCNC: 0.67 MG/DL (ref 0.57–1)
DEPRECATED RDW RBC AUTO: 47.8 FL (ref 37–54)
EOSINOPHIL # BLD AUTO: 0.12 10*3/MM3 (ref 0–0.4)
EOSINOPHIL NFR BLD AUTO: 1.9 % (ref 0.3–6.2)
ERYTHROCYTE [DISTWIDTH] IN BLOOD BY AUTOMATED COUNT: 12.5 % (ref 12.3–15.4)
GFR SERPL CREATININE-BSD FRML MDRD: 90 ML/MIN/1.73
GLOBULIN UR ELPH-MCNC: 2.4 GM/DL
GLUCOSE SERPL-MCNC: 108 MG/DL (ref 65–99)
HBA1C MFR BLD: 5.31 % (ref 4.8–5.6)
HCT VFR BLD AUTO: 34.6 % (ref 34–46.6)
HDLC SERPL-MCNC: 39 MG/DL (ref 40–60)
HGB BLD-MCNC: 11.5 G/DL (ref 12–15.9)
IMM GRANULOCYTES # BLD AUTO: 0.02 10*3/MM3 (ref 0–0.05)
IMM GRANULOCYTES NFR BLD AUTO: 0.3 % (ref 0–0.5)
LDLC SERPL CALC-MCNC: 142 MG/DL (ref 0–100)
LDLC/HDLC SERPL: 3.55 {RATIO}
LYMPHOCYTES # BLD AUTO: 1.69 10*3/MM3 (ref 0.7–3.1)
LYMPHOCYTES NFR BLD AUTO: 26.1 % (ref 19.6–45.3)
MCH RBC QN AUTO: 34.5 PG (ref 26.6–33)
MCHC RBC AUTO-ENTMCNC: 33.2 G/DL (ref 31.5–35.7)
MCV RBC AUTO: 103.9 FL (ref 79–97)
MONOCYTES # BLD AUTO: 0.97 10*3/MM3 (ref 0.1–0.9)
MONOCYTES NFR BLD AUTO: 15 % (ref 5–12)
NEUTROPHILS NFR BLD AUTO: 3.62 10*3/MM3 (ref 1.7–7)
NEUTROPHILS NFR BLD AUTO: 55.9 % (ref 42.7–76)
NRBC BLD AUTO-RTO: 0 /100 WBC (ref 0–0.2)
PLATELET # BLD AUTO: 91 10*3/MM3 (ref 140–450)
PMV BLD AUTO: 12.6 FL (ref 6–12)
POTASSIUM SERPL-SCNC: 4 MMOL/L (ref 3.5–5.2)
PROT SERPL-MCNC: 6.3 G/DL (ref 6–8.5)
QT INTERVAL: 451 MS
RBC # BLD AUTO: 3.33 10*6/MM3 (ref 3.77–5.28)
SODIUM SERPL-SCNC: 138 MMOL/L (ref 136–145)
TRIGL SERPL-MCNC: 162 MG/DL (ref 0–150)
TSH SERPL DL<=0.05 MIU/L-ACNC: 1.96 UIU/ML (ref 0.27–4.2)
VLDLC SERPL-MCNC: 29 MG/DL (ref 5–40)
WBC # BLD AUTO: 6.47 10*3/MM3 (ref 3.4–10.8)

## 2021-06-19 PROCEDURE — 96361 HYDRATE IV INFUSION ADD-ON: CPT

## 2021-06-19 PROCEDURE — 99204 OFFICE O/P NEW MOD 45 MIN: CPT | Performed by: PSYCHIATRY & NEUROLOGY

## 2021-06-19 PROCEDURE — 93005 ELECTROCARDIOGRAM TRACING: CPT | Performed by: HOSPITALIST

## 2021-06-19 PROCEDURE — 80061 LIPID PANEL: CPT | Performed by: HOSPITALIST

## 2021-06-19 PROCEDURE — 93010 ELECTROCARDIOGRAM REPORT: CPT | Performed by: INTERNAL MEDICINE

## 2021-06-19 PROCEDURE — 80050 GENERAL HEALTH PANEL: CPT | Performed by: HOSPITALIST

## 2021-06-19 PROCEDURE — 25810000003 SODIUM CHLORIDE 0.9 % WITH KCL 20 MEQ 20-0.9 MEQ/L-% SOLUTION: Performed by: HOSPITALIST

## 2021-06-19 PROCEDURE — 36415 COLL VENOUS BLD VENIPUNCTURE: CPT | Performed by: HOSPITALIST

## 2021-06-19 PROCEDURE — 99244 OFF/OP CNSLTJ NEW/EST MOD 40: CPT | Performed by: INTERNAL MEDICINE

## 2021-06-19 PROCEDURE — 83036 HEMOGLOBIN GLYCOSYLATED A1C: CPT | Performed by: HOSPITALIST

## 2021-06-19 PROCEDURE — G0378 HOSPITAL OBSERVATION PER HR: HCPCS

## 2021-06-19 RX ORDER — ATORVASTATIN CALCIUM 40 MG/1
40 TABLET, FILM COATED ORAL DAILY
COMMUNITY
Start: 2021-04-26 | End: 2021-10-23

## 2021-06-19 RX ADMIN — ACETAMINOPHEN 650 MG: 325 TABLET, FILM COATED ORAL at 09:16

## 2021-06-19 RX ADMIN — ACETAMINOPHEN 650 MG: 325 TABLET, FILM COATED ORAL at 16:50

## 2021-06-19 RX ADMIN — ATORVASTATIN CALCIUM 40 MG: 20 TABLET, FILM COATED ORAL at 21:43

## 2021-06-19 RX ADMIN — POTASSIUM CHLORIDE AND SODIUM CHLORIDE 75 ML/HR: 900; 150 INJECTION, SOLUTION INTRAVENOUS at 21:52

## 2021-06-19 RX ADMIN — ASPIRIN 81 MG: 81 TABLET, CHEWABLE ORAL at 09:12

## 2021-06-19 RX ADMIN — POTASSIUM CHLORIDE AND SODIUM CHLORIDE 125 ML/HR: 900; 150 INJECTION, SOLUTION INTRAVENOUS at 02:45

## 2021-06-19 RX ADMIN — ACETAMINOPHEN 650 MG: 325 TABLET, FILM COATED ORAL at 21:50

## 2021-06-19 RX ADMIN — PANTOPRAZOLE SODIUM 40 MG: 40 TABLET, DELAYED RELEASE ORAL at 05:52

## 2021-06-19 RX ADMIN — POTASSIUM CHLORIDE AND SODIUM CHLORIDE 125 ML/HR: 900; 150 INJECTION, SOLUTION INTRAVENOUS at 10:53

## 2021-06-19 NOTE — PLAN OF CARE
Goal Outcome Evaluation:  Plan of Care Reviewed With: patient           Outcome Summary: Pt with c/o headache and dizziness while up this shift.  Orthostatic VSS charted, pt symptomatic.  Pt to have ECHO before discharge and to be sent home with zio patch.  All needs met this shift, will continue to monitor

## 2021-06-19 NOTE — PROGRESS NOTES
"Daily progress note    Chief complaint  Doing same   No new complaints  Denies any chest pain shortness of breath palpitation    History of present illness  60-year-old white female with no medical problem home medication brought to the emergency room after syncopal episode while she was at work. Patient was feeling dizzy and lightheaded but no other complaints. Patient does not recall what happened. At the time of interview she is fully alert oriented with some headache as she hit her head on the left side and back. Patient has no loss of control of bowel bladder or tongue biting. Patient has no focal weakness but looks puffy on the left face. Patient admitted for management.     REVIEW OF SYSTEMS  Review of all 14 systems is negative other than stated in the HPI above.     PHYSICAL EXAM  Blood pressure 135/89, pulse 92, temperature 98.4 °F (36.9 °C), temperature source Oral, resp. rate 16, height 157.5 cm (62\"), weight 73.4 kg (161 lb 14.4 oz), SpO2 96 %.    GENERAL: Awake and alert, no acute distress  HENT: nares patent, no palpable skull fracture or scalp hematoma  EYES: no scleral icterus, pupils 3 mm reactive bilaterally, EOMI  CV: regular rhythm, normal rate, no murmur  RESPIRATORY: normal effort, lungs clear auscultation bilaterally  ABDOMEN: soft, nondistended, nontender throughout  MUSCULOSKELETAL: no deformity, no point tenderness throughout bilateral upper or lower extremities  NEURO: alert, fully oriented, moves all extremities, follows commands.  Normal strength in bilateral upper extremities with no drift.  Normal strength in bilateral lower extremities.  Normal sensation to light touch throughout all extremities.  Speech is fluent.  PSYCH:  calm, cooperative  SKIN: warm, dry     LAB RESULTS  Lab Results (last 24 hours)     Procedure Component Value Units Date/Time    TSH [[id number]]  (Normal) Collected: 06/19/21 0418    Specimen: Blood Updated: 06/19/21 0851     TSH 1.960 uIU/mL     Comprehensive " Metabolic Panel [[id number]]  (Abnormal) Collected: 06/19/21 0418    Specimen: Blood Updated: 06/19/21 0518     Glucose 108 mg/dL      BUN 12 mg/dL      Creatinine 0.67 mg/dL      Sodium 138 mmol/L      Potassium 4.0 mmol/L      Chloride 108 mmol/L      CO2 21.2 mmol/L      Calcium 8.7 mg/dL      Total Protein 6.3 g/dL      Albumin 3.90 g/dL      ALT (SGPT) 35 U/L      AST (SGOT) 37 U/L      Alkaline Phosphatase 60 U/L      Total Bilirubin 0.3 mg/dL      eGFR Non African Amer 90 mL/min/1.73      Globulin 2.4 gm/dL      A/G Ratio 1.6 g/dL      BUN/Creatinine Ratio 17.9     Anion Gap 8.8 mmol/L     Narrative:      GFR Normal >60  Chronic Kidney Disease <60  Kidney Failure <15      Lipid Panel [[id number]]  (Abnormal) Collected: 06/19/21 0418    Specimen: Blood Updated: 06/19/21 0517     Total Cholesterol 210 mg/dL      Triglycerides 162 mg/dL      HDL Cholesterol 39 mg/dL      LDL Cholesterol  142 mg/dL      VLDL Cholesterol 29 mg/dL      LDL/HDL Ratio 3.55    Narrative:      Cholesterol Reference Ranges  (U.S. Department of Health and Human Services ATP III Classifications)    Desirable          <200 mg/dL  Borderline High    200-239 mg/dL  High Risk          >240 mg/dL      Triglyceride Reference Ranges  (U.S. Department of Health and Human Services ATP III Classifications)    Normal           <150 mg/dL  Borderline High  150-199 mg/dL  High             200-499 mg/dL  Very High        >500 mg/dL    HDL Reference Ranges  (U.S. Department of Health and Human Services ATP III Classifcations)    Low     <40 mg/dl (major risk factor for CHD)  High    >60 mg/dl ('negative' risk factor for CHD)        LDL Reference Ranges  (U.S. Department of Health and Human Services ATP III Classifcations)    Optimal          <100 mg/dL  Near Optimal     100-129 mg/dL  Borderline High  130-159 mg/dL  High             160-189 mg/dL  Very High        >189 mg/dL    Hemoglobin A1c [[id number]]  (Normal) Collected: 06/19/21 0418    Specimen:  Blood Updated: 06/19/21 0509     Hemoglobin A1C 5.31 %     Narrative:      Hemoglobin A1C Ranges:    Increased Risk for Diabetes  5.7% to 6.4%  Diabetes                     >= 6.5%  Diabetic Goal                < 7.0%    CBC & Differential [[id number]]  (Abnormal) Collected: 06/19/21 0418    Specimen: Blood Updated: 06/19/21 0453    Narrative:      The following orders were created for panel order CBC & Differential.  Procedure                               Abnormality         Status                     ---------                               -----------         ------                     CBC Auto Differential[[id number]]        Abnormal            Final result                 Please view results for these tests on the individual orders.    CBC Auto Differential [[id number]]  (Abnormal) Collected: 06/19/21 0418    Specimen: Blood Updated: 06/19/21 0453     WBC 6.47 10*3/mm3      RBC 3.33 10*6/mm3      Hemoglobin 11.5 g/dL      Hematocrit 34.6 %      .9 fL      MCH 34.5 pg      MCHC 33.2 g/dL      RDW 12.5 %      RDW-SD 47.8 fl      MPV 12.6 fL      Platelets 91 10*3/mm3      Comment: Not first occurrence          Neutrophil % 55.9 %      Lymphocyte % 26.1 %      Monocyte % 15.0 %      Eosinophil % 1.9 %      Basophil % 0.8 %      Immature Grans % 0.3 %      Neutrophils, Absolute 3.62 10*3/mm3      Lymphocytes, Absolute 1.69 10*3/mm3      Monocytes, Absolute 0.97 10*3/mm3      Eosinophils, Absolute 0.12 10*3/mm3      Basophils, Absolute 0.05 10*3/mm3      Immature Grans, Absolute 0.02 10*3/mm3      nRBC 0.0 /100 WBC         Imaging Results (Last 24 Hours)     ** No results found for the last 24 hours. **           ECG 12 Lead  Component   Ref Range & Units 6/18/21 1237   QT Interval   ms 451 P           HEART RATE= 71  bpm  RR Interval= 844  ms  RI Interval= 159  ms  P Horizontal Axis= 17  deg  P Front Axis= 48  deg  QRSD Interval= 96  ms  QT Interval= 451  ms  QRS Axis= -23  deg  T Wave Axis= 26  deg  -  BORDERLINE ECG -  Sinus rhythm  Borderline left axis deviation  Borderline prolonged QT interval               Current Facility-Administered Medications:   •  acetaminophen (TYLENOL) tablet 650 mg, 650 mg, Oral, Q4H PRN, Johnny Trammell MD, 650 mg at 06/19/21 0916  •  aspirin chewable tablet 81 mg, 81 mg, Oral, Daily, Johnny Trammell MD, 81 mg at 06/19/21 0912  •  atorvastatin (LIPITOR) tablet 40 mg, 40 mg, Oral, Nightly, Johnny Trammell MD, 40 mg at 06/18/21 2149  •  pantoprazole (PROTONIX) EC tablet 40 mg, 40 mg, Oral, Q AM, Johnny Trammell MD, 40 mg at 06/19/21 0552  •  [COMPLETED] Insert peripheral IV, , , Once **AND** sodium chloride 0.9 % flush 10 mL, 10 mL, Intravenous, PRN, Pedro Judd MD  •  sodium chloride 0.9 % with KCl 20 mEq/L infusion, 125 mL/hr, Intravenous, Continuous, Johnny Trammell MD, Last Rate: 125 mL/hr at 06/19/21 1053, 125 mL/hr at 06/19/21 1053     ASSESSMENT  Syncopal episode questionable etiology  Dehydration  Hypokalemia replaced  Left facial droop resolved  Thrombocytopenia    PLAN  CPM  IVF  Neurochecks every 4 hours  Check orthostasis  Aspirin Lipitor  Neuro consult pending  Cardiology to follow patient  Stress ulcer DVT prophylaxis  Supportive care  PT/OT  Discussed with nursing staff  Follow closely with recommendation current hospital course    JOHNNY TRAMMELL MD

## 2021-06-19 NOTE — CONSULTS
Patient Identification:  NAME:  Subha Gilbert  Age:  60 y.o.   Sex:  female   :  1960   MRN:  [id number]       Chief complaint: I passed out at work, reason for consult syncope  History of present illness: This patient is a 60-year-old right-handed white female with history of hypertension elevated cholesterol previous knee surgery who was at work yesterday and felt lightheaded and weak she definitely felt like she was going to pass out and indeed did pass out she fell striking the left side of her head and the back of her head she does have some swelling there is context also context the patient was not feeling that well and for the last few days was not feeling well but no fever chills or rash no focal paresthesias paralysis or double vision the duration of the syncope is not known the quality was true syncope with ample warning modifying factors almost certainly lying flat on the floor afterwards allowed her to awaken and she did awaken without postictal state no tongue biting or incontinence she has a mild headache today CT by my independent eyeball review is normal no evidence of acute stroke or intracranial hemorrhage.      Past medical history:  Past Medical History:   Diagnosis Date   • Elevated cholesterol    • Hypertension        Past surgical history:  Past Surgical History:   Procedure Laterality Date   • KNEE SURGERY         Allergies:  Penicillins    Home medications:  No medications prior to admission.        Hospital medications:  aspirin, 81 mg, Oral, Daily  atorvastatin, 40 mg, Oral, Nightly  pantoprazole, 40 mg, Oral, Q AM      sodium chloride 0.9 % with KCl 20 mEq, 75 mL/hr, Last Rate: 125 mL/hr (21 1053)      •  acetaminophen  •  [COMPLETED] Insert peripheral IV **AND** sodium chloride    Family history:  No family history on file.    Social history:  Social History     Tobacco Use   • Smoking status: Never Smoker   Substance Use Topics   • Alcohol use: Not on file   •  Drug use: Not on file       Review of systems:    She denies hair eyes ears nose throat skin bone joint  lymphatic hematologic oncologic complaints no neck pain she does have a slight headache on the left side where she headache.  No chest pain abdominal pain bowel bladder incontinence fever chills or rash she did not bite her tongue    Objective:  Vitals Ranges:   Temp:  [98.2 °F (36.8 °C)-98.8 °F (37.1 °C)] 98.4 °F (36.9 °C)  Heart Rate:  [] 92  Resp:  [16] 16  BP: (108-135)/(64-89) 135/89      Physical Exam: Awake alert oriented x3 in no distress fund of knowledge attention span concentration recent remote memory and language is all normal well-developed well-nourished no distress pupils 3 constricting to 2 unable to visualize disc retinas extraocular movements are full without nystagmus nasolabial folds palate tongue symmetrical normal hearing facial sensation head turning shoulder shrug motor 5 to 5 x 4 extremities no atrophy fasciculations rigidity bradykinesia resting tremor reflexes trace throughout symmetrical toes downgoing bilaterally sensation normal light touch face both arms both legs coordination normal in the upper extremity she is able to ambulate without ataxia heart regular without murmur neck supple without bruits extremities no clubbing cyanosis edema    Results review:   I reviewed the patient's new clinical results.    Data review:  Lab Results (last 24 hours)     Procedure Component Value Units Date/Time    TSH [[id number]]  (Normal) Collected: 06/19/21 0418    Specimen: Blood Updated: 06/19/21 0851     TSH 1.960 uIU/mL     Comprehensive Metabolic Panel [[id number]]  (Abnormal) Collected: 06/19/21 0418    Specimen: Blood Updated: 06/19/21 0518     Glucose 108 mg/dL      BUN 12 mg/dL      Creatinine 0.67 mg/dL      Sodium 138 mmol/L      Potassium 4.0 mmol/L      Chloride 108 mmol/L      CO2 21.2 mmol/L      Calcium 8.7 mg/dL      Total Protein 6.3 g/dL      Albumin 3.90 g/dL      ALT  (SGPT) 35 U/L      AST (SGOT) 37 U/L      Alkaline Phosphatase 60 U/L      Total Bilirubin 0.3 mg/dL      eGFR Non African Amer 90 mL/min/1.73      Globulin 2.4 gm/dL      A/G Ratio 1.6 g/dL      BUN/Creatinine Ratio 17.9     Anion Gap 8.8 mmol/L     Narrative:      GFR Normal >60  Chronic Kidney Disease <60  Kidney Failure <15      Lipid Panel [[id number]]  (Abnormal) Collected: 06/19/21 0418    Specimen: Blood Updated: 06/19/21 0517     Total Cholesterol 210 mg/dL      Triglycerides 162 mg/dL      HDL Cholesterol 39 mg/dL      LDL Cholesterol  142 mg/dL      VLDL Cholesterol 29 mg/dL      LDL/HDL Ratio 3.55    Narrative:      Cholesterol Reference Ranges  (U.S. Department of Health and Human Services ATP III Classifications)    Desirable          <200 mg/dL  Borderline High    200-239 mg/dL  High Risk          >240 mg/dL      Triglyceride Reference Ranges  (U.S. Department of Health and Human Services ATP III Classifications)    Normal           <150 mg/dL  Borderline High  150-199 mg/dL  High             200-499 mg/dL  Very High        >500 mg/dL    HDL Reference Ranges  (U.S. Department of Health and Human Services ATP III Classifcations)    Low     <40 mg/dl (major risk factor for CHD)  High    >60 mg/dl ('negative' risk factor for CHD)        LDL Reference Ranges  (U.S. Department of Health and Human Services ATP III Classifcations)    Optimal          <100 mg/dL  Near Optimal     100-129 mg/dL  Borderline High  130-159 mg/dL  High             160-189 mg/dL  Very High        >189 mg/dL    Hemoglobin A1c [[id number]]  (Normal) Collected: 06/19/21 0418    Specimen: Blood Updated: 06/19/21 0509     Hemoglobin A1C 5.31 %     Narrative:      Hemoglobin A1C Ranges:    Increased Risk for Diabetes  5.7% to 6.4%  Diabetes                     >= 6.5%  Diabetic Goal                < 7.0%    CBC & Differential [[id number]]  (Abnormal) Collected: 06/19/21 0418    Specimen: Blood Updated: 06/19/21 0453    Narrative:      The  following orders were created for panel order CBC & Differential.  Procedure                               Abnormality         Status                     ---------                               -----------         ------                     CBC Auto Differential[[id number]]        Abnormal            Final result                 Please view results for these tests on the individual orders.    CBC Auto Differential [[id number]]  (Abnormal) Collected: 06/19/21 0418    Specimen: Blood Updated: 06/19/21 0453     WBC 6.47 10*3/mm3      RBC 3.33 10*6/mm3      Hemoglobin 11.5 g/dL      Hematocrit 34.6 %      .9 fL      MCH 34.5 pg      MCHC 33.2 g/dL      RDW 12.5 %      RDW-SD 47.8 fl      MPV 12.6 fL      Platelets 91 10*3/mm3      Comment: Not first occurrence          Neutrophil % 55.9 %      Lymphocyte % 26.1 %      Monocyte % 15.0 %      Eosinophil % 1.9 %      Basophil % 0.8 %      Immature Grans % 0.3 %      Neutrophils, Absolute 3.62 10*3/mm3      Lymphocytes, Absolute 1.69 10*3/mm3      Monocytes, Absolute 0.97 10*3/mm3      Eosinophils, Absolute 0.12 10*3/mm3      Basophils, Absolute 0.05 10*3/mm3      Immature Grans, Absolute 0.02 10*3/mm3      nRBC 0.0 /100 WBC            Imaging:  Imaging Results (Last 24 Hours)     ** No results found for the last 24 hours. **         PPE worn at all times washed before washed afterwards disposed of everything properly was not within 6 feet of her for more than few minutes normal exam no aerosols used at any point    Assessment and Plan:     This patient suffered syncope with ample warning while she was working at [employer].  She felt lightheaded dizzy weak and then lost consciousness.  She did fall and suffered a concussion.  Nonetheless she does not look like an acute stroke TIA or primary seizure disorder.  There is no evidence of any focal neurologic abnormality and she looks great today.  I am confident that this patient had some orthostatic hypotension but would also  want to rule out the possibility of recurrent bradycardia.  I would recommend a Zio patch at the time of her discharge but at this point I would not recommend further neurologic work-up other than checking an orthostatic blood pressure.  .  She is a little postconcussive but doing well.  Neurology will sign off and follow-up.  Reconsult thanks      Papi Balbuena MD  06/19/21  13:04 EDT

## 2021-06-19 NOTE — PLAN OF CARE
Goal Outcome Evaluation:  Plan of Care Reviewed With: patient        Progress: improving  Outcome Summary: Pt no complaints this shift, on RA, SR. Negative for orthostatic BP. IVF continues @125mL/hr. Neuro check q4hrs. Up with stand by assist to bathroom. VSS. Will continue to monitor.

## 2021-06-19 NOTE — NURSING NOTE
Patient's coworker called this afternoon, stated that pt had 2 seizures approximately 1 minute each about 30 seconds apart.  Will inform on call md

## 2021-06-19 NOTE — CONSULTS
[facility] Consult Note       Encounter Date:21  Patient:Subha Gilbert  :1960  MRN:[id number]    Date of Admission: 2021  Date of Encounter Visit: 21  Encounter Provider: Nick Weeks MD  Referring Provider: Sudheer Trammell MD  Place of Service: [facility]  Patient Care Team:  Fredy Del Cid MD as PCP - General (Family Medicine)      Consulted for: Syncope w/ long QT    Chief Complaint: Syncope       History of Presenting Illness:      Mr. Gilbert is a 60 y.o. woman with past medical history notable for mixed hyperlipidemia who presented to the hospital with an episode of syncope.  General she has not had any other prior episodes of syncope and only had one episode.  Prior to this episode which occurred yesterday she has had about a week of feeling lightheaded and groggy and kneeling for.  The episode occurred while at work.  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  She went to go sit down but fell backwards and hit her head and face.  By the time she awoke she was back to her previous baseline and EMS evaluated her had concerns regarding some mild facial droop and weakness in her left hand .  Transferred to our institution for further evaluation.  Of note on initial evaluation patient was normotensive but was noted to be hypokalemic.  She does report having some mild diarrhea over the last week.  Her QTC was also noted to be borderline long for this reason we have been asked to see her to evaluate her.    She denies any new medications or any prior episodes of syncope.  She has not had any prior cardiac work-up in the past.  This morning she is feeling much better after IV fluids.      Review of Systems:  Review of Systems   Constitutional: Positive for malaise/fatigue.   HENT: Negative.    Eyes: Negative.    Cardiovascular: Positive for syncope.   Respiratory: Negative.    Endocrine: Negative.    Hematologic/Lymphatic:  Negative.    Skin: Negative.    Musculoskeletal: Negative.    Gastrointestinal: Negative.    Genitourinary: Negative.    Neurological: Positive for dizziness and weakness.   Psychiatric/Behavioral: Negative.    Allergic/Immunologic: Negative.        Medications:    Current Facility-Administered Medications:   •  acetaminophen (TYLENOL) tablet 650 mg, 650 mg, Oral, Q4H PRN, Sudheer Trammell MD, 650 mg at 06/19/21 0916  •  aspirin chewable tablet 81 mg, 81 mg, Oral, Daily, Sudheer Trammell MD, 81 mg at 06/19/21 0912  •  atorvastatin (LIPITOR) tablet 40 mg, 40 mg, Oral, Nightly, Sudheer Trammell MD, 40 mg at 06/18/21 2149  •  pantoprazole (PROTONIX) EC tablet 40 mg, 40 mg, Oral, Q AM, Sudheer Trammell MD, 40 mg at 06/19/21 0552  •  [COMPLETED] Insert peripheral IV, , , Once **AND** sodium chloride 0.9 % flush 10 mL, 10 mL, Intravenous, PRN, Pedro Judd MD  •  sodium chloride 0.9 % with KCl 20 mEq/L infusion, 125 mL/hr, Intravenous, Continuous, Sudhere Trammell MD, Last Rate: 125 mL/hr at 06/19/21 1053, 125 mL/hr at 06/19/21 1053    Allergies   Allergen Reactions   • Penicillins Itching       Past Medical History:   Diagnosis Date   • Elevated cholesterol    • Hypertension        Past Surgical History:   Procedure Laterality Date   • KNEE SURGERY         Social History     Socioeconomic History   • Marital status:      Spouse name: Not on file   • Number of children: Not on file   • Years of education: Not on file   • Highest education level: Not on file   Tobacco Use   • Smoking status: Never Smoker   Substance and Sexual Activity   • Sexual activity: Defer       No family history on file.    The following portions of the patient's history were reviewed and updated as appropriate: allergies, current medications, past family history, past medical history, past social history, past surgical history and problem list.         Objective:      Temp:  [98.2 °F (36.8 °C)-98.8 °F (37.1 °C)] 98.4 °F (36.9 °C)  Heart Rate:   [] 92  Resp:  [16] 16  BP: (108-135)/(64-89) 135/89     Intake/Output Summary (Last 24 hours) at 6/19/2021 1221  Last data filed at 6/19/2021 1053  Gross per 24 hour   Intake 3206.67 ml   Output --   Net 3206.67 ml     Body mass index is 29.61 kg/m².      06/18/21  1158   Weight: 73.4 kg (161 lb 14.4 oz)           Physical Exam:  Constitutional: Well appearing, well developed, no acute distress   HENT: Oropharynx clear and membrane moist  Eyes: Normal conjunctiva, no sclera icterus.  Neck: Supple, no carotid bruit bilaterally.  Cardiovascular: Regular rate and rhythm, No Murmur, No bilateral lower extremity edema.  Pulmonary: Normal respiratory effort, normal lung sounds, no wheezing.  Abdominal: Soft, nontender, no hepatosplenomegaly, liver is non-pulsatile.  Neurological: Alert and orient x 3.   Skin: Warm, dry, no ecchymosis, no rash.  Psych: Appropriate mood and affect. Normal judgment and insight.         Lab Review:   Results from last 7 days   Lab Units 06/19/21  0418 06/18/21  1202   SODIUM mmol/L 138 138   POTASSIUM mmol/L 4.0 3.0*   CHLORIDE mmol/L 108* 100   CO2 mmol/L 21.2* 19.1*   BUN mg/dL 12 17   CREATININE mg/dL 0.67 1.12*   GLUCOSE mg/dL 108* 177*   CALCIUM mg/dL 8.7 9.7   AST (SGOT) U/L 37* 57*   ALT (SGPT) U/L 35* 48*     Results from last 7 days   Lab Units 06/18/21  1202   TROPONIN T ng/mL <0.010     Results from last 7 days   Lab Units 06/19/21  0418 06/18/21  1202   WBC 10*3/mm3 6.47 9.40   HEMOGLOBIN g/dL 11.5* 12.6   HEMATOCRIT % 34.6 37.7   PLATELETS 10*3/mm3 91* 106*         Results from last 7 days   Lab Units 06/18/21  1202   MAGNESIUM mg/dL 1.6     Results from last 7 days   Lab Units 06/19/21  0418   CHOLESTEROL mg/dL 210*   TRIGLYCERIDES mg/dL 162*   HDL CHOL mg/dL 39*     Results from last 7 days   Lab Units 06/18/21  1202   PROBNP pg/mL 184.5     Results from last 7 days   Lab Units 06/19/21  0418   TSH uIU/mL 1.960           EKG 6/19/2021      Prior EKG 11/14/2018    I  reviewed the EKGs above which demonstrates sinus rhythm.  May be some borderline QT C prolongation but overall relative benign appearing EKGs.       Assessment:           Syncope and collapse         Plan:       Mr. Gilbert is a 60 y.o. woman with past medical history notable for mixed hyperlipidemia who presented to the hospital with an episode of syncope.  General presentation could be consistent with a vasovagal or orthostatic hypotension could be related to her diarrhea.  She was hypokalemic on exam this is been corrected with IV fluids and potassium.  In general she is doing much better this morning.  We will follow up on her echocardiogram.  If her echocardiogram is normal be fine to discharge home from the hospital from a cardiac perspective.  I would recommend that we send her home on a 2-week monitor to make sure were not missing any significant arrhythmic events.  Her borderline prolonged QT interval is not particularly impressive and may be related to her hypokalemia.  Is grossly unchanged from prior EKGs.  Again we can follow-up with an outpatient event monitor.      Syncope:  · Follow-up echocardiogram  · Follow-up with outpatient 2-week monitor    QT prolongation:  · QT interval borderline and will follow up on monitor and echocardiogram results  · Patient currently not on any medications that could cause this  · Correct potassium and magnesium        Thank you for allowing me to participate in the care of Subha Gilbert. Feel free to contact me directly with any further questions or concerns.    iNck Weeks MD  [facility]  06/19/21  12:21 EDT

## 2021-06-20 ENCOUNTER — APPOINTMENT (OUTPATIENT)
Dept: CARDIOLOGY | Facility: HOSPITAL | Age: 61
End: 2021-06-20

## 2021-06-20 VITALS
TEMPERATURE: 98.4 F | RESPIRATION RATE: 18 BRPM | DIASTOLIC BLOOD PRESSURE: 70 MMHG | OXYGEN SATURATION: 98 % | SYSTOLIC BLOOD PRESSURE: 134 MMHG | BODY MASS INDEX: 29.63 KG/M2 | WEIGHT: 161 LBS | HEART RATE: 74 BPM | HEIGHT: 62 IN

## 2021-06-20 LAB
ANION GAP SERPL CALCULATED.3IONS-SCNC: 8.2 MMOL/L (ref 5–15)
ASCENDING AORTA: 3.3 CM
BASOPHILS # BLD AUTO: 0.03 10*3/MM3 (ref 0–0.2)
BASOPHILS NFR BLD AUTO: 0.4 % (ref 0–1.5)
BH CV ECHO MEAS - ACS: 1.5 CM
BH CV ECHO MEAS - AO MAX PG (FULL): 4.8 MMHG
BH CV ECHO MEAS - AO MAX PG: 7.4 MMHG
BH CV ECHO MEAS - AO MEAN PG (FULL): 3 MMHG
BH CV ECHO MEAS - AO MEAN PG: 4 MMHG
BH CV ECHO MEAS - AO ROOT AREA (BSA CORRECTED): 1.6
BH CV ECHO MEAS - AO ROOT AREA: 6.2 CM^2
BH CV ECHO MEAS - AO ROOT DIAM: 2.8 CM
BH CV ECHO MEAS - AO V2 MAX: 136 CM/SEC
BH CV ECHO MEAS - AO V2 MEAN: 87 CM/SEC
BH CV ECHO MEAS - AO V2 VTI: 30.2 CM
BH CV ECHO MEAS - ASC AORTA: 3.3 CM
BH CV ECHO MEAS - AVA(I,A): 1.9 CM^2
BH CV ECHO MEAS - AVA(I,D): 1.9 CM^2
BH CV ECHO MEAS - AVA(V,A): 1.9 CM^2
BH CV ECHO MEAS - AVA(V,D): 1.9 CM^2
BH CV ECHO MEAS - BSA(HAYCOCK): 1.8 M^2
BH CV ECHO MEAS - BSA: 1.7 M^2
BH CV ECHO MEAS - BZI_BMI: 29.4 KILOGRAMS/M^2
BH CV ECHO MEAS - BZI_METRIC_HEIGHT: 157.5 CM
BH CV ECHO MEAS - BZI_METRIC_WEIGHT: 73 KG
BH CV ECHO MEAS - EDV(MOD-SP2): 55 ML
BH CV ECHO MEAS - EDV(MOD-SP4): 50 ML
BH CV ECHO MEAS - EDV(TEICH): 118.2 ML
BH CV ECHO MEAS - EF(CUBED): 76.2 %
BH CV ECHO MEAS - EF(MOD-BP): 51 %
BH CV ECHO MEAS - EF(MOD-SP2): 50.9 %
BH CV ECHO MEAS - EF(MOD-SP4): 52 %
BH CV ECHO MEAS - EF(TEICH): 67.9 %
BH CV ECHO MEAS - ESV(MOD-SP2): 27 ML
BH CV ECHO MEAS - ESV(MOD-SP4): 24 ML
BH CV ECHO MEAS - ESV(TEICH): 37.9 ML
BH CV ECHO MEAS - FS: 38 %
BH CV ECHO MEAS - IVS/LVPW: 1
BH CV ECHO MEAS - IVSD: 0.9 CM
BH CV ECHO MEAS - LAT PEAK E' VEL: 7.9 CM/SEC
BH CV ECHO MEAS - LV DIASTOLIC VOL/BSA (35-75): 28.7 ML/M^2
BH CV ECHO MEAS - LV MASS(C)D: 158.2 GRAMS
BH CV ECHO MEAS - LV MASS(C)DI: 90.8 GRAMS/M^2
BH CV ECHO MEAS - LV MAX PG: 2.6 MMHG
BH CV ECHO MEAS - LV MEAN PG: 1 MMHG
BH CV ECHO MEAS - LV SYSTOLIC VOL/BSA (12-30): 13.8 ML/M^2
BH CV ECHO MEAS - LV V1 MAX: 81.3 CM/SEC
BH CV ECHO MEAS - LV V1 MEAN: 53.6 CM/SEC
BH CV ECHO MEAS - LV V1 VTI: 17.9 CM
BH CV ECHO MEAS - LVIDD: 5 CM
BH CV ECHO MEAS - LVIDS: 3.1 CM
BH CV ECHO MEAS - LVLD AP2: 7.1 CM
BH CV ECHO MEAS - LVLD AP4: 6.4 CM
BH CV ECHO MEAS - LVLS AP2: 6 CM
BH CV ECHO MEAS - LVLS AP4: 4.9 CM
BH CV ECHO MEAS - LVOT AREA (M): 3.1 CM^2
BH CV ECHO MEAS - LVOT AREA: 3.1 CM^2
BH CV ECHO MEAS - LVOT DIAM: 2 CM
BH CV ECHO MEAS - LVPWD: 0.9 CM
BH CV ECHO MEAS - MED PEAK E' VEL: 7.2 CM/SEC
BH CV ECHO MEAS - MR MAX PG: 71.2 MMHG
BH CV ECHO MEAS - MR MAX VEL: 422 CM/SEC
BH CV ECHO MEAS - MV A DUR: 0.17 SEC
BH CV ECHO MEAS - MV A MAX VEL: 53.1 CM/SEC
BH CV ECHO MEAS - MV DEC SLOPE: 321 CM/SEC^2
BH CV ECHO MEAS - MV DEC TIME: 0.19 SEC
BH CV ECHO MEAS - MV E MAX VEL: 51.4 CM/SEC
BH CV ECHO MEAS - MV E/A: 0.97
BH CV ECHO MEAS - MV MAX PG: 2.1 MMHG
BH CV ECHO MEAS - MV MEAN PG: 1 MMHG
BH CV ECHO MEAS - MV P1/2T MAX VEL: 70.8 CM/SEC
BH CV ECHO MEAS - MV P1/2T: 64.6 MSEC
BH CV ECHO MEAS - MV V2 MAX: 72.2 CM/SEC
BH CV ECHO MEAS - MV V2 MEAN: 45.9 CM/SEC
BH CV ECHO MEAS - MV V2 VTI: 20.6 CM
BH CV ECHO MEAS - MVA P1/2T LCG: 3.1 CM^2
BH CV ECHO MEAS - MVA(P1/2T): 3.4 CM^2
BH CV ECHO MEAS - MVA(VTI): 2.7 CM^2
BH CV ECHO MEAS - PA ACC TIME: 0.1 SEC
BH CV ECHO MEAS - PA MAX PG (FULL): 1.8 MMHG
BH CV ECHO MEAS - PA MAX PG: 2.3 MMHG
BH CV ECHO MEAS - PA PR(ACCEL): 36.3 MMHG
BH CV ECHO MEAS - PA V2 MAX: 75.2 CM/SEC
BH CV ECHO MEAS - PULM A REVS DUR: 0.14 SEC
BH CV ECHO MEAS - PULM A REVS VEL: 30.2 CM/SEC
BH CV ECHO MEAS - PULM DIAS VEL: 28.4 CM/SEC
BH CV ECHO MEAS - PULM S/D: 1.7
BH CV ECHO MEAS - PULM SYS VEL: 48 CM/SEC
BH CV ECHO MEAS - PVA(V,A): 1.7 CM^2
BH CV ECHO MEAS - PVA(V,D): 1.7 CM^2
BH CV ECHO MEAS - QP/QS: 0.44
BH CV ECHO MEAS - RAP SYSTOLE: 3 MMHG
BH CV ECHO MEAS - RV MAX PG: 0.46 MMHG
BH CV ECHO MEAS - RV MEAN PG: 0 MMHG
BH CV ECHO MEAS - RV V1 MAX: 33.9 CM/SEC
BH CV ECHO MEAS - RV V1 MEAN: 22.8 CM/SEC
BH CV ECHO MEAS - RV V1 VTI: 6.5 CM
BH CV ECHO MEAS - RVOT AREA: 3.8 CM^2
BH CV ECHO MEAS - RVOT DIAM: 2.2 CM
BH CV ECHO MEAS - RVSP: 21.7 MMHG
BH CV ECHO MEAS - SI(AO): 106.7 ML/M^2
BH CV ECHO MEAS - SI(CUBED): 54.6 ML/M^2
BH CV ECHO MEAS - SI(LVOT): 32.3 ML/M^2
BH CV ECHO MEAS - SI(MOD-SP2): 16.1 ML/M^2
BH CV ECHO MEAS - SI(MOD-SP4): 14.9 ML/M^2
BH CV ECHO MEAS - SI(TEICH): 46.1 ML/M^2
BH CV ECHO MEAS - SV(AO): 186 ML
BH CV ECHO MEAS - SV(CUBED): 95.2 ML
BH CV ECHO MEAS - SV(LVOT): 56.2 ML
BH CV ECHO MEAS - SV(MOD-SP2): 28 ML
BH CV ECHO MEAS - SV(MOD-SP4): 26 ML
BH CV ECHO MEAS - SV(RVOT): 24.9 ML
BH CV ECHO MEAS - SV(TEICH): 80.3 ML
BH CV ECHO MEAS - TAPSE (>1.6): 1.9 CM
BH CV ECHO MEAS - TR MAX VEL: 216 CM/SEC
BH CV ECHO MEASUREMENTS AVERAGE E/E' RATIO: 6.81
BH CV XLRA - RV BASE: 3.4 CM
BH CV XLRA - RV LENGTH: 5.8 CM
BH CV XLRA - RV MID: 2.7 CM
BH CV XLRA - TDI S': 9.1 CM/SEC
BUN SERPL-MCNC: 11 MG/DL (ref 8–23)
BUN/CREAT SERPL: 16.2 (ref 7–25)
CALCIUM SPEC-SCNC: 9.1 MG/DL (ref 8.6–10.5)
CHLORIDE SERPL-SCNC: 109 MMOL/L (ref 98–107)
CLUMPED PLATELETS: NORMAL
CO2 SERPL-SCNC: 21.8 MMOL/L (ref 22–29)
CREAT SERPL-MCNC: 0.68 MG/DL (ref 0.57–1)
DEPRECATED RDW RBC AUTO: 45.4 FL (ref 37–54)
DEPRECATED RDW RBC AUTO: 47.5 FL (ref 37–54)
EOSINOPHIL # BLD AUTO: 0.16 10*3/MM3 (ref 0–0.4)
EOSINOPHIL NFR BLD AUTO: 2.3 % (ref 0.3–6.2)
ERYTHROCYTE [DISTWIDTH] IN BLOOD BY AUTOMATED COUNT: 12.2 % (ref 12.3–15.4)
ERYTHROCYTE [DISTWIDTH] IN BLOOD BY AUTOMATED COUNT: 12.3 % (ref 12.3–15.4)
GFR SERPL CREATININE-BSD FRML MDRD: 88 ML/MIN/1.73
GLUCOSE SERPL-MCNC: 104 MG/DL (ref 65–99)
HCT VFR BLD AUTO: 34.8 % (ref 34–46.6)
HCT VFR BLD AUTO: 35.3 % (ref 34–46.6)
HGB BLD-MCNC: 11.5 G/DL (ref 12–15.9)
HGB BLD-MCNC: 11.5 G/DL (ref 12–15.9)
LEFT ATRIUM VOLUME INDEX: 29 ML/M2
LYMPHOCYTES # BLD AUTO: 1.84 10*3/MM3 (ref 0.7–3.1)
LYMPHOCYTES NFR BLD AUTO: 26.9 % (ref 19.6–45.3)
MCH RBC QN AUTO: 34.1 PG (ref 26.6–33)
MCH RBC QN AUTO: 34.2 PG (ref 26.6–33)
MCHC RBC AUTO-ENTMCNC: 32.6 G/DL (ref 31.5–35.7)
MCHC RBC AUTO-ENTMCNC: 33 G/DL (ref 31.5–35.7)
MCV RBC AUTO: 103.3 FL (ref 79–97)
MCV RBC AUTO: 105.1 FL (ref 79–97)
MONOCYTES # BLD AUTO: 0.81 10*3/MM3 (ref 0.1–0.9)
MONOCYTES NFR BLD AUTO: 11.8 % (ref 5–12)
NEUTROPHILS NFR BLD AUTO: 3.98 10*3/MM3 (ref 1.7–7)
NEUTROPHILS NFR BLD AUTO: 58.2 % (ref 42.7–76)
PLATELET # BLD AUTO: 150 10*3/MM3 (ref 140–450)
PLATELET # BLD AUTO: 77 10*3/MM3 (ref 140–450)
PMV BLD AUTO: 12.3 FL (ref 6–12)
PMV BLD AUTO: 12.8 FL (ref 6–12)
POTASSIUM SERPL-SCNC: 4.4 MMOL/L (ref 3.5–5.2)
QT INTERVAL: 404 MS
RBC # BLD AUTO: 3.36 10*6/MM3 (ref 3.77–5.28)
RBC # BLD AUTO: 3.37 10*6/MM3 (ref 3.77–5.28)
RBC MORPH BLD: NORMAL
SINUS: 3 CM
SODIUM SERPL-SCNC: 139 MMOL/L (ref 136–145)
STJ: 2.6 CM
WBC # BLD AUTO: 6.74 10*3/MM3 (ref 3.4–10.8)
WBC # BLD AUTO: 6.85 10*3/MM3 (ref 3.4–10.8)
WBC MORPH BLD: NORMAL

## 2021-06-20 PROCEDURE — 99212 OFFICE O/P EST SF 10 MIN: CPT | Performed by: PSYCHIATRY & NEUROLOGY

## 2021-06-20 PROCEDURE — G0378 HOSPITAL OBSERVATION PER HR: HCPCS

## 2021-06-20 PROCEDURE — 99204 OFFICE O/P NEW MOD 45 MIN: CPT | Performed by: INTERNAL MEDICINE

## 2021-06-20 PROCEDURE — 85007 BL SMEAR W/DIFF WBC COUNT: CPT | Performed by: HOSPITALIST

## 2021-06-20 PROCEDURE — 85027 COMPLETE CBC AUTOMATED: CPT | Performed by: INTERNAL MEDICINE

## 2021-06-20 PROCEDURE — 93306 TTE W/DOPPLER COMPLETE: CPT

## 2021-06-20 PROCEDURE — 80048 BASIC METABOLIC PNL TOTAL CA: CPT | Performed by: HOSPITALIST

## 2021-06-20 PROCEDURE — 93246 EXT ECG>7D<15D RECORDING: CPT

## 2021-06-20 PROCEDURE — 93306 TTE W/DOPPLER COMPLETE: CPT | Performed by: INTERNAL MEDICINE

## 2021-06-20 PROCEDURE — 96361 HYDRATE IV INFUSION ADD-ON: CPT

## 2021-06-20 PROCEDURE — 85025 COMPLETE CBC W/AUTO DIFF WBC: CPT | Performed by: HOSPITALIST

## 2021-06-20 RX ORDER — PANTOPRAZOLE SODIUM 40 MG/1
40 TABLET, DELAYED RELEASE ORAL DAILY
Qty: 30 TABLET | Refills: 0 | Status: SHIPPED | OUTPATIENT
Start: 2021-06-20 | End: 2021-07-20

## 2021-06-20 RX ORDER — ASPIRIN 81 MG/1
81 TABLET, CHEWABLE ORAL DAILY
Qty: 30 TABLET | Refills: 0 | Status: SHIPPED | OUTPATIENT
Start: 2021-06-21 | End: 2021-07-21

## 2021-06-20 RX ADMIN — ASPIRIN 81 MG: 81 TABLET, CHEWABLE ORAL at 10:13

## 2021-06-20 RX ADMIN — ACETAMINOPHEN 650 MG: 325 TABLET, FILM COATED ORAL at 10:13

## 2021-06-20 RX ADMIN — PANTOPRAZOLE SODIUM 40 MG: 40 TABLET, DELAYED RELEASE ORAL at 05:54

## 2021-06-20 NOTE — PLAN OF CARE
Goal Outcome Evaluation:  Plan of Care Reviewed With: patient           Outcome Summary: Pt to be discharged with zio patch and follow up appointments.  All discharge instructions reviewed and questions answered.  VSS, will continue to monitor

## 2021-06-20 NOTE — DISCHARGE SUMMARY
Discharge summary    Date of admission 6/18/2021  Date of discharge 6/20/2021    Final diagnosis  Syncopal episode secondary to hypotension  No TIA CVA or seizure disorder  Hyperlipidemia  S/p dehydration  Hypokalemia replaced  Left facial droop resolved  Thrombocytopenia    Discharge medications    Current Facility-Administered Medications:   •  aspirin chewable tablet 81 mg, 81 mg, Oral, Daily, Sudheer Trammell MD, 81 mg at 06/20/21 1013  •  atorvastatin (LIPITOR) tablet 40 mg, 40 mg, Oral, Nightly, Sudheer Trammell MD, 40 mg at 06/19/21 2143  •  pantoprazole (PROTONIX) EC tablet 40 mg, 40 mg, Oral, Q AM, Sudheer Trammell MD, 40 mg at 06/20/21 0521  •  [COMPLETED] Insert peripheral IV, , , Once **AND** sodium chloride 0.9 % flush 10 mL, 10 mL, Intravenous, PRN, Pedro Judd MD     Consults obtained  Neurology  Cardiology  Hematology     Procedures   2D echo within normal limits    Hospital course  60-year-old white female with no medical problem no home medications admit to emergency room after syncopal episode.  Patient noted that she felt  dizzy and passed out.  Patient has no loss of control of bowel bladder or tongue biting but there was questionable shaking spell per nursing staff.  Patient work-up in ER revealed dehydration low potassium and prolonged QT interval admit for management.  Patient work-up with CT head was unremarkable and 2D echo was normal and she has low blood pressure which is stabilized and potassium replaced.  Patient further evaluated by neurology and cardiology and they recommend no further work-up at this time and she needs to be monitored with Zio patch and will follow as an outpatient.  Patient platelets dropped to 77 I will let hematology to see her before she goes home and it should be okay with them.  Patient has no complaint whatsoever at the time of discharge.    Discharge diet regular    Activity as tolerated    Medication as above    Follow-up with primary doctor in 1 week and  follow with cardiology neurology and hematology per their instruction and take medication as directed    JOHNNY PATTON MD

## 2021-06-20 NOTE — PROGRESS NOTES
Patient Identification:  NAME:  Subha Gilbert  Age:  60 y.o.   Sex:  female   :  1960   MRN:  [id number]       Chief complaint: Syncope, concussion, syncopal seizure/contact seizure    History of present illness: He is awake alert wants to go home she will go home with a Zio patch.  She states that she does drink some but not at all on a regular basis and not heavily.  Again she recalls ample warning that she was going to pass out before this and woke up on the floor without any significant postictal state some coworkers said she did have some shaking and jerking and look like she had a seizure while she was on the floor after she had collapsed and hit her head.      Past medical history:  Past Medical History:   Diagnosis Date   • Elevated cholesterol    • Hypertension        Allergies:  Penicillins    Home medications:  Medications Prior to Admission   Medication Sig Dispense Refill Last Dose   • atorvastatin (LIPITOR) 40 MG tablet Take 40 mg by mouth Daily.           Hospital medications:  aspirin, 81 mg, Oral, Daily  atorvastatin, 40 mg, Oral, Nightly  pantoprazole, 40 mg, Oral, Q AM         [COMPLETED] Insert peripheral IV **AND** sodium chloride      Objective:  Vitals Ranges:   Temp:  [98.2 °F (36.8 °C)-98.6 °F (37 °C)] 98.4 °F (36.9 °C)  Heart Rate:  [72-98] 74  Resp:  [16-18] 18  BP: (111-154)/() 134/70      Physical Exam: Patient is awake alert oriented x3 good history.  Normal cranial nerves II through VII tongue is midline good motor x4 extremities reflexes trace throughout symmetrical toes downgoing bilaterally    Results review:   I reviewed the patient's new clinical results.    Data review:  Lab Results (last 24 hours)     Procedure Component Value Units Date/Time    CBC & Differential [[id number]]  (Abnormal) Collected: 21 0553    Specimen: Blood Updated: 21 2572    Narrative:      The following orders were created for panel order CBC & Differential.  Procedure                                Abnormality         Status                     ---------                               -----------         ------                     Scan Slide[[id number]]                   Normal              Final result               CBC Auto Differential[[id number]]        Abnormal            Final result                 Please view results for these tests on the individual orders.    CBC Auto Differential [[id number]]  (Abnormal) Collected: 06/20/21 0550    Specimen: Blood Updated: 06/20/21 0757     WBC 6.85 10*3/mm3      RBC 3.37 10*6/mm3      Hemoglobin 11.5 g/dL      Hematocrit 34.8 %      .3 fL      MCH 34.1 pg      MCHC 33.0 g/dL      RDW 12.3 %      RDW-SD 45.4 fl      MPV 12.3 fL      Platelets 77 10*3/mm3      Neutrophil % 58.2 %      Lymphocyte % 26.9 %      Monocyte % 11.8 %      Eosinophil % 2.3 %      Basophil % 0.4 %      Neutrophils, Absolute 3.98 10*3/mm3      Lymphocytes, Absolute 1.84 10*3/mm3      Monocytes, Absolute 0.81 10*3/mm3      Eosinophils, Absolute 0.16 10*3/mm3      Basophils, Absolute 0.03 10*3/mm3     Scan Slide [[id number]]  (Normal) Collected: 06/20/21 0550    Specimen: Blood Updated: 06/20/21 0757     RBC Morphology Normal     WBC Morphology Normal     Platelet Morphology Normal    Basic Metabolic Panel [[id number]]  (Abnormal) Collected: 06/20/21 0550    Specimen: Blood from Arm, Left Updated: 06/20/21 0705     Glucose 104 mg/dL      BUN 11 mg/dL      Creatinine 0.68 mg/dL      Sodium 139 mmol/L      Potassium 4.4 mmol/L      Chloride 109 mmol/L      CO2 21.8 mmol/L      Calcium 9.1 mg/dL      eGFR Non African Amer 88 mL/min/1.73      BUN/Creatinine Ratio 16.2     Anion Gap 8.2 mmol/L     Narrative:      GFR Normal >60  Chronic Kidney Disease <60  Kidney Failure <15             Imaging:  Imaging Results (Last 24 Hours)     ** No results found for the last 24 hours. **         PPE worn at all times washed before washed afterwards disposed of everything properly was  now within 6 feet of her for more than few minutes during my exam no aerosols used at any point    Assessment and Plan:       Syncope and collapse    This patient still sounds absolutely like vasodepressor/orthostatic syncope she absolutely had an ample warning that she was going to pass out.  A coworker has noted that she did look like she had a couple seizures as she was lying on the ground and this would be typical of a contact seizure from hitting her head that hard and also from the fact that she has had syncope and a probable syncopal seizure  Note the patient did not have a postictal state did not bite her tongue did not wet herself and has never had a seizure in her life.   In short I will check an outpatient EEG but based upon her history my exam I absolutely do not believe this patient has any type of primary seizure disorder.  Will follow up with her primary MD and does have a Zio patch to wear thank        Papi Balbuena MD  06/20/21  14:01 EDT

## 2021-06-20 NOTE — CONSULTS
Subjective     REASON FOR CONSULTATION: Thrombocytopenia  Provide an opinion on any further workup or treatment                             REQUESTING PHYSICIAN: Dr. Trammell    RECORDS OBTAINED:  Records of the patients history including those obtained from the referring provider were reviewed and summarized in detail.    HISTORY OF PRESENT ILLNESS:  The patient is a 60 y.o. year old female who is here for an opinion about the above issue.  We are asked to see the patient for thrombocytopenia.  She was worked up on this admission for possible syncopal episode.  She is being discharged home on a ZIO Patch but was noted to have a lower platelet count today of 77,000 and for that reason her discharge has been held up and we were asked to see the patient.    On reviewing her records it appears she had a similar issue in 2018 at [facility].  She was seen at that time by Dr. Jamir Rocha and it was felt that she may have some platelet clumping.  She has no prior history of bleeding issues.  On further review of her records there was also a blood count from the [facility] in April 2021 with demonstrated platelet clumping.    History of Present Illness     Past Medical History:   Diagnosis Date   • Elevated cholesterol    • Hypertension         Past Surgical History:   Procedure Laterality Date   • KNEE SURGERY          No current facility-administered medications on file prior to encounter.     Current Outpatient Medications on File Prior to Encounter   Medication Sig Dispense Refill   • atorvastatin (LIPITOR) 40 MG tablet Take 40 mg by mouth Daily.          ALLERGIES:    Allergies   Allergen Reactions   • Penicillins Itching        Social History     Socioeconomic History   • Marital status:      Spouse name: Not on file   • Number of children: Not on file   • Years of education: Not on file   • Highest education level: Not on file   Tobacco Use   • Smoking status: Never Smoker   Substance and Sexual  "Activity   • Sexual activity: Defer        No family history on file.     Review of Systems   Constitutional: Negative for activity change, chills, fatigue and fever.   HENT: Negative for mouth sores, trouble swallowing and voice change.    Eyes: Negative for pain and visual disturbance.   Respiratory: Negative for cough, shortness of breath and wheezing.    Cardiovascular: Negative for chest pain and palpitations.   Gastrointestinal: Negative for abdominal pain, constipation, diarrhea, nausea and vomiting.   Genitourinary: Negative for difficulty urinating, frequency and urgency.   Musculoskeletal: Negative for arthralgias and joint swelling.   Skin: Negative for rash.   Neurological: Positive for syncope. Negative for dizziness, seizures, weakness and headaches.   Hematological: Negative for adenopathy. Does not bruise/bleed easily.   Psychiatric/Behavioral: Negative for behavioral problems and confusion. The patient is not nervous/anxious.         Objective     Vitals:    06/20/21 0553 06/20/21 0700 06/20/21 0954 06/20/21 1300   BP: 128/79 148/95 144/92 134/70   BP Location: Left arm Left arm  Right arm   Patient Position: Lying Sitting  Lying   Pulse:  78 78 74   Resp:  18  18   Temp:  98.4 °F (36.9 °C)  98.4 °F (36.9 °C)   TempSrc:  Oral  Oral   SpO2:  98% 98%    Weight:   73 kg (161 lb)    Height:   157.5 cm (62\")      No flowsheet data found.    Physical Exam  Constitutional:       General: She is not in acute distress.     Appearance: She is well-developed.   HENT:      Head: Normocephalic.   Eyes:      General: No scleral icterus.     Conjunctiva/sclera: Conjunctivae normal.      Pupils: Pupils are equal, round, and reactive to light.   Neck:      Thyroid: No thyromegaly.      Vascular: No JVD.   Cardiovascular:      Rate and Rhythm: Normal rate and regular rhythm.      Heart sounds: No murmur heard.   No friction rub. No gallop.    Pulmonary:      Effort: Pulmonary effort is normal.      Breath sounds: " Normal breath sounds. No wheezing or rales.   Abdominal:      General: There is no distension.      Palpations: Abdomen is soft. There is no mass.      Tenderness: There is no abdominal tenderness.   Musculoskeletal:         General: No deformity. Normal range of motion.      Cervical back: Normal range of motion and neck supple.   Lymphadenopathy:      Cervical: No cervical adenopathy.   Skin:     General: Skin is warm and dry.      Findings: No erythema or rash.   Neurological:      Mental Status: She is alert and oriented to person, place, and time.      Cranial Nerves: No cranial nerve deficit.      Deep Tendon Reflexes: Reflexes are normal and symmetric.   Psychiatric:         Behavior: Behavior normal.         Judgment: Judgment normal.           RECENT LABS:  Hematology WBC   Date Value Ref Range Status   06/20/2021 6.85 3.40 - 10.80 10*3/mm3 Final     RBC   Date Value Ref Range Status   06/20/2021 3.37 (L) 3.77 - 5.28 10*6/mm3 Final     Hemoglobin   Date Value Ref Range Status   06/20/2021 11.5 (L) 12.0 - 15.9 g/dL Final     Hematocrit   Date Value Ref Range Status   06/20/2021 34.8 34.0 - 46.6 % Final     Platelets   Date Value Ref Range Status   06/20/2021 77 (L) 140 - 450 10*3/mm3 Final        Lab Results   Component Value Date    GLUCOSE 104 (H) 06/20/2021    BUN 11 06/20/2021    CREATININE 0.68 06/20/2021    EGFRIFNONA 88 06/20/2021    BCR 16.2 06/20/2021    K 4.4 06/20/2021    CO2 21.8 (L) 06/20/2021    CALCIUM 9.1 06/20/2021    ALBUMIN 3.90 06/19/2021    LABIL2 1.1 06/24/2019    AST 37 (H) 06/19/2021    ALT 35 (H) 06/19/2021         Assessment/Plan     1.  Thrombocytopenia most likely artifactual due to platelet clumping.  Patient has had these issues in the past documented from [facility] in 2018 when she was seen by Dr. Jamir Rocha.  She also had a more recent CBC at [facility] in April 2021 demonstrating platelet clumping also.    Recommendations  1.  We discussed the issue with the  patient and do not feel that further work-up is indicated at this time.  We will review her smear in the lab.  2.  Okay from our standpoint for patient to be discharged home.  No outpatient hematology follow-up necessary.

## 2021-06-20 NOTE — PROGRESS NOTES
"Daily progress note    Chief complaint  Doing better  No new complaints  Wants to go home    History of present illness  60-year-old white female with no medical problem home medication brought to the emergency room after syncopal episode while she was at work. Patient was feeling dizzy and lightheaded but no other complaints. Patient does not recall what happened. At the time of interview she is fully alert oriented with some headache as she hit her head on the left side and back. Patient has no loss of control of bowel bladder or tongue biting. Patient has no focal weakness but looks puffy on the left face. Patient admitted for management.     REVIEW OF SYSTEMS  Unremarkable     PHYSICAL EXAM  Blood pressure 144/92, pulse 78, temperature 98.4 °F (36.9 °C), temperature source Oral, resp. rate 18, height 157.5 cm (62\"), weight 73 kg (161 lb), SpO2 98 %.    GENERAL: Awake and alert, no acute distress  HENT: nares patent, no palpable skull fracture or scalp hematoma  EYES: no scleral icterus, pupils 3 mm reactive bilaterally, EOMI  CV: regular rhythm, normal rate, no murmur  RESPIRATORY: normal effort, lungs clear auscultation bilaterally  ABDOMEN: soft, nondistended, nontender throughout  MUSCULOSKELETAL: no deformity, no point tenderness throughout bilateral upper or lower extremities  NEURO: alert, fully oriented, moves all extremities, follows commands.  Normal strength in bilateral upper extremities with no drift.  Normal strength in bilateral lower extremities.  Normal sensation to light touch throughout all extremities.  Speech is fluent.  PSYCH:  calm, cooperative  SKIN: warm, dry     LAB RESULTS  Lab Results (last 24 hours)     Procedure Component Value Units Date/Time    CBC & Differential [[id number]]  (Abnormal) Collected: 06/20/21 0550    Specimen: Blood Updated: 06/20/21 2855    Narrative:      The following orders were created for panel order CBC & Differential.  Procedure                               " Abnormality         Status                     ---------                               -----------         ------                     Scan Slide[[id number]]                   Normal              Final result               CBC Auto Differential[[id number]]        Abnormal            Final result                 Please view results for these tests on the individual orders.    CBC Auto Differential [[id number]]  (Abnormal) Collected: 06/20/21 0550    Specimen: Blood Updated: 06/20/21 0757     WBC 6.85 10*3/mm3      RBC 3.37 10*6/mm3      Hemoglobin 11.5 g/dL      Hematocrit 34.8 %      .3 fL      MCH 34.1 pg      MCHC 33.0 g/dL      RDW 12.3 %      RDW-SD 45.4 fl      MPV 12.3 fL      Platelets 77 10*3/mm3      Neutrophil % 58.2 %      Lymphocyte % 26.9 %      Monocyte % 11.8 %      Eosinophil % 2.3 %      Basophil % 0.4 %      Neutrophils, Absolute 3.98 10*3/mm3      Lymphocytes, Absolute 1.84 10*3/mm3      Monocytes, Absolute 0.81 10*3/mm3      Eosinophils, Absolute 0.16 10*3/mm3      Basophils, Absolute 0.03 10*3/mm3     Scan Slide [[id number]]  (Normal) Collected: 06/20/21 0550    Specimen: Blood Updated: 06/20/21 0757     RBC Morphology Normal     WBC Morphology Normal     Platelet Morphology Normal    Basic Metabolic Panel [[id number]]  (Abnormal) Collected: 06/20/21 0550    Specimen: Blood from Arm, Left Updated: 06/20/21 0705     Glucose 104 mg/dL      BUN 11 mg/dL      Creatinine 0.68 mg/dL      Sodium 139 mmol/L      Potassium 4.4 mmol/L      Chloride 109 mmol/L      CO2 21.8 mmol/L      Calcium 9.1 mg/dL      eGFR Non African Amer 88 mL/min/1.73      BUN/Creatinine Ratio 16.2     Anion Gap 8.2 mmol/L     Narrative:      GFR Normal >60  Chronic Kidney Disease <60  Kidney Failure <15          Imaging Results (Last 24 Hours)     ** No results found for the last 24 hours. **           ECG 12 Lead  Component   Ref Range & Units 6/18/21 1237   QT Interval   ms 451 P           HEART RATE= 71  bpm  RR Interval=  844  ms  HI Interval= 159  ms  P Horizontal Axis= 17  deg  P Front Axis= 48  deg  QRSD Interval= 96  ms  QT Interval= 451  ms  QRS Axis= -23  deg  T Wave Axis= 26  deg  - BORDERLINE ECG -  Sinus rhythm  Borderline left axis deviation  Borderline prolonged QT interval               Current Facility-Administered Medications:   •  acetaminophen (TYLENOL) tablet 650 mg, 650 mg, Oral, Q4H PRN, Johnny Trammell MD, 650 mg at 06/20/21 1013  •  aspirin chewable tablet 81 mg, 81 mg, Oral, Daily, Johnny Trammell MD, 81 mg at 06/20/21 1013  •  atorvastatin (LIPITOR) tablet 40 mg, 40 mg, Oral, Nightly, Johnny Trammell MD, 40 mg at 06/19/21 7993  •  pantoprazole (PROTONIX) EC tablet 40 mg, 40 mg, Oral, Q AM, Johnny Trammell MD, 40 mg at 06/20/21 9546  •  [COMPLETED] Insert peripheral IV, , , Once **AND** sodium chloride 0.9 % flush 10 mL, 10 mL, Intravenous, PRN, Pedro Judd MD     ASSESSMENT  Syncopal episode secondary to hypotension  No TIA CVA or seizure disorder  Dehydration  Hypokalemia replaced  Left facial droop resolved  Thrombocytopenia    PLAN  Discharge home on Zio patch if okay with cardiology neurology and hematology  Discharge summary discharge    JOHNNY TRAMMELL MD

## 2021-06-21 NOTE — CASE MANAGEMENT/SOCIAL WORK
Case Management Discharge Note      Final Note: DC'd home 6/20                  Transportation Services  Private: Car    Final Discharge Disposition Code: 01 - home or self-care

## 2021-07-19 LAB
MAXIMAL PREDICTED HEART RATE: 160 BPM
STRESS TARGET HR: 136 BPM

## 2021-07-19 PROCEDURE — 93248 EXT ECG>7D<15D REV&INTERPJ: CPT | Performed by: INTERNAL MEDICINE

## 2021-08-10 ENCOUNTER — TELEPHONE (OUTPATIENT)
Dept: CARDIOLOGY | Facility: CLINIC | Age: 61
End: 2021-08-10

## 2021-08-10 NOTE — TELEPHONE ENCOUNTER
Have tried calling the patient multiple times throughout the last month to give results of event monitor which did show some atrial fibrillation.  Still cannot get a hold over.  Have left voicemails multiple times.  He just left one today but if we could maybe follow-up at the end of the week with another call from the office just to try and finally reach out as would be good to get her into the office due to her paroxysmal atrial fibrillation.

## 2021-08-13 NOTE — TELEPHONE ENCOUNTER
Called patient no answer left voicemail on both pt phone and daughter phone number we have on file.

## 2021-08-18 NOTE — TELEPHONE ENCOUNTER
Letter composed and mailed to the patient requesting a return call for results and follow up appointment.    Thank you,  Bonita Leung RN  [facility]  Triage

## 2022-05-04 ENCOUNTER — HOSPITAL ENCOUNTER (EMERGENCY)
Facility: HOSPITAL | Age: 62
Discharge: HOME | End: 2022-05-05
Attending: EMERGENCY MEDICINE | Admitting: EMERGENCY MEDICINE

## 2022-05-04 ENCOUNTER — APPOINTMENT (OUTPATIENT)
Dept: CT IMAGING | Facility: HOSPITAL | Age: 62
End: 2022-05-04

## 2022-05-04 DIAGNOSIS — F33.9 RECURRENT MAJOR DEPRESSIVE DISORDER, REMISSION STATUS UNSPECIFIED: ICD-10-CM

## 2022-05-04 DIAGNOSIS — F10.920 ALCOHOLIC INTOXICATION WITHOUT COMPLICATION: Primary | ICD-10-CM

## 2022-05-04 DIAGNOSIS — N39.0 ACUTE UTI: ICD-10-CM

## 2022-05-04 LAB
ALBUMIN SERPL-MCNC: 4.5 G/DL (ref 3.5–5.2)
ALBUMIN/GLOB SERPL: 1.2 G/DL
ALP SERPL-CCNC: 98 U/L (ref 39–117)
ALT SERPL W P-5'-P-CCNC: 31 U/L (ref 1–33)
AMPHET+METHAMPHET UR QL: NEGATIVE
AMPHETAMINES UR QL: NEGATIVE
ANION GAP SERPL CALCULATED.3IONS-SCNC: 24.2 MMOL/L (ref 5–15)
AST SERPL-CCNC: 29 U/L (ref 1–32)
BACTERIA UR QL AUTO: ABNORMAL /HPF
BARBITURATES UR QL SCN: NEGATIVE
BASOPHILS # BLD AUTO: 0.05 10*3/MM3 (ref 0–0.2)
BASOPHILS NFR BLD AUTO: 0.6 % (ref 0–1.5)
BENZODIAZ UR QL SCN: NEGATIVE
BILIRUB SERPL-MCNC: 0.2 MG/DL (ref 0–1.2)
BILIRUB UR QL STRIP: NEGATIVE
BUN SERPL-MCNC: 18 MG/DL (ref 8–23)
BUN/CREAT SERPL: 20 (ref 7–25)
BUPRENORPHINE SERPL-MCNC: NEGATIVE NG/ML
CALCIUM SPEC-SCNC: 9 MG/DL (ref 8.6–10.5)
CANNABINOIDS SERPL QL: NEGATIVE
CHLORIDE SERPL-SCNC: 94 MMOL/L (ref 98–107)
CLARITY UR: CLEAR
CO2 SERPL-SCNC: 18.8 MMOL/L (ref 22–29)
COCAINE UR QL: NEGATIVE
COLOR UR: YELLOW
CREAT SERPL-MCNC: 0.9 MG/DL (ref 0.57–1)
DEPRECATED RDW RBC AUTO: 46.5 FL (ref 37–54)
EGFRCR SERPLBLD CKD-EPI 2021: 72.9 ML/MIN/1.73
EOSINOPHIL # BLD AUTO: 0.03 10*3/MM3 (ref 0–0.4)
EOSINOPHIL NFR BLD AUTO: 0.4 % (ref 0.3–6.2)
ERYTHROCYTE [DISTWIDTH] IN BLOOD BY AUTOMATED COUNT: 13.1 % (ref 12.3–15.4)
ETHANOL BLD-MCNC: 337 MG/DL (ref 0–10)
ETHANOL UR QL: 0.34 %
GLOBULIN UR ELPH-MCNC: 3.8 GM/DL
GLUCOSE SERPL-MCNC: 124 MG/DL (ref 65–99)
GLUCOSE UR STRIP-MCNC: NEGATIVE MG/DL
HCT VFR BLD AUTO: 39.5 % (ref 34–46.6)
HGB BLD-MCNC: 13.1 G/DL (ref 12–15.9)
HGB UR QL STRIP.AUTO: NEGATIVE
HYALINE CASTS UR QL AUTO: ABNORMAL /LPF
IMM GRANULOCYTES # BLD AUTO: 0.04 10*3/MM3 (ref 0–0.05)
IMM GRANULOCYTES NFR BLD AUTO: 0.5 % (ref 0–0.5)
KETONES UR QL STRIP: NEGATIVE
LEUKOCYTE ESTERASE UR QL STRIP.AUTO: ABNORMAL
LYMPHOCYTES # BLD AUTO: 2.51 10*3/MM3 (ref 0.7–3.1)
LYMPHOCYTES NFR BLD AUTO: 32.1 % (ref 19.6–45.3)
MCH RBC QN AUTO: 32.3 PG (ref 26.6–33)
MCHC RBC AUTO-ENTMCNC: 33.2 G/DL (ref 31.5–35.7)
MCV RBC AUTO: 97.3 FL (ref 79–97)
METHADONE UR QL SCN: NEGATIVE
MONOCYTES # BLD AUTO: 0.5 10*3/MM3 (ref 0.1–0.9)
MONOCYTES NFR BLD AUTO: 6.4 % (ref 5–12)
NEUTROPHILS NFR BLD AUTO: 4.69 10*3/MM3 (ref 1.7–7)
NEUTROPHILS NFR BLD AUTO: 60 % (ref 42.7–76)
NITRITE UR QL STRIP: NEGATIVE
NRBC BLD AUTO-RTO: 0 /100 WBC (ref 0–0.2)
OPIATES UR QL: NEGATIVE
OXYCODONE UR QL SCN: NEGATIVE
PCP UR QL SCN: NEGATIVE
PH UR STRIP.AUTO: <=5 [PH] (ref 4.5–8)
PLATELET # BLD AUTO: 162 10*3/MM3 (ref 140–450)
PMV BLD AUTO: 12.2 FL (ref 6–12)
POTASSIUM SERPL-SCNC: 4.2 MMOL/L (ref 3.5–5.2)
PROPOXYPH UR QL: NEGATIVE
PROT SERPL-MCNC: 8.3 G/DL (ref 6–8.5)
PROT UR QL STRIP: NEGATIVE
QT INTERVAL: 359 MS
RBC # BLD AUTO: 4.06 10*6/MM3 (ref 3.77–5.28)
RBC # UR STRIP: ABNORMAL /HPF
REF LAB TEST METHOD: ABNORMAL
SODIUM SERPL-SCNC: 137 MMOL/L (ref 136–145)
SP GR UR STRIP: 1.02 (ref 1–1.03)
SQUAMOUS #/AREA URNS HPF: ABNORMAL /HPF
TRICYCLICS UR QL SCN: NEGATIVE
TROPONIN T SERPL-MCNC: <0.01 NG/ML (ref 0–0.03)
UROBILINOGEN UR QL STRIP: ABNORMAL
WBC # UR STRIP: ABNORMAL /HPF
WBC NRBC COR # BLD: 7.82 10*3/MM3 (ref 3.4–10.8)

## 2022-05-04 PROCEDURE — 82077 ASSAY SPEC XCP UR&BREATH IA: CPT | Performed by: EMERGENCY MEDICINE

## 2022-05-04 PROCEDURE — 81001 URINALYSIS AUTO W/SCOPE: CPT | Performed by: EMERGENCY MEDICINE

## 2022-05-04 PROCEDURE — 80306 DRUG TEST PRSMV INSTRMNT: CPT | Performed by: EMERGENCY MEDICINE

## 2022-05-04 PROCEDURE — 93005 ELECTROCARDIOGRAM TRACING: CPT | Performed by: EMERGENCY MEDICINE

## 2022-05-04 PROCEDURE — 96365 THER/PROPH/DIAG IV INF INIT: CPT

## 2022-05-04 PROCEDURE — 84484 ASSAY OF TROPONIN QUANT: CPT | Performed by: EMERGENCY MEDICINE

## 2022-05-04 PROCEDURE — 80053 COMPREHEN METABOLIC PANEL: CPT | Performed by: EMERGENCY MEDICINE

## 2022-05-04 PROCEDURE — 87086 URINE CULTURE/COLONY COUNT: CPT | Performed by: EMERGENCY MEDICINE

## 2022-05-04 PROCEDURE — 99283 EMERGENCY DEPT VISIT LOW MDM: CPT

## 2022-05-04 PROCEDURE — 99283 EMERGENCY DEPT VISIT LOW MDM: CPT | Performed by: EMERGENCY MEDICINE

## 2022-05-04 PROCEDURE — 93010 ELECTROCARDIOGRAM REPORT: CPT | Performed by: INTERNAL MEDICINE

## 2022-05-04 PROCEDURE — 25010000002 THIAMINE PER 100 MG: Performed by: EMERGENCY MEDICINE

## 2022-05-04 PROCEDURE — 85025 COMPLETE CBC W/AUTO DIFF WBC: CPT | Performed by: EMERGENCY MEDICINE

## 2022-05-04 PROCEDURE — 70450 CT HEAD/BRAIN W/O DYE: CPT

## 2022-05-04 RX ORDER — DULOXETIN HYDROCHLORIDE 60 MG/1
60 CAPSULE, DELAYED RELEASE ORAL DAILY
COMMUNITY
Start: 2022-03-29 | End: 2022-09-25

## 2022-05-04 RX ORDER — CEFUROXIME AXETIL 500 MG/1
500 TABLET ORAL 2 TIMES DAILY
Qty: 10 TABLET | Refills: 0 | Status: SHIPPED | OUTPATIENT
Start: 2022-05-04

## 2022-05-04 RX ORDER — CEFUROXIME AXETIL 250 MG/1
250 TABLET ORAL ONCE
Status: COMPLETED | OUTPATIENT
Start: 2022-05-04 | End: 2022-05-04

## 2022-05-04 RX ADMIN — FOLIC ACID 1000 ML/HR: 5 INJECTION, SOLUTION INTRAMUSCULAR; INTRAVENOUS; SUBCUTANEOUS at 19:25

## 2022-05-04 RX ADMIN — CEFUROXIME AXETIL 250 MG: 250 TABLET, FILM COATED ORAL at 20:02

## 2022-05-04 NOTE — ED PROVIDER NOTES
Subjective   History of Present Illness  History of Present Illness    Chief complaint: Weakness and depression    Location: Generalized    Quality/Severity: Patient fell today    Timing/Onset/Duration: Over the last several months, gradual onset    Modifying Factors: Nothing seems to make it better    Associated Symptoms: No headache.  No fever chills or cough.  No sore throat earache or nasal congestion.  No chest pain or shortness of breath.  No palpitations.  No abdominal pain.  No burning when she urinates.  She has chronic diarrhea related to her diet.  No bloody stools.    Narrative: This 61-year-old white female, non-smoker, with a history of hypertension and elevated cholesterol, presents with weakness and depression.  The patient has been weak for months.  The patient's brother went to check on her today and she was very weak and fell on the floor.  The patient did not hit her head.  The patient is severely depressed according to the brother.  The brother is power of .  The patient drinks alcohol.  She last had vodka 3 days ago.  She has never gone through DTs or withdrawal seizures.  The patient has a history of depression.  She has been more depressed.  She denies suicidal or homicidal ideation.  The patient denies suicidal or homicidal ideation.  There is no hallucinations.      PCP:Fredy Del Cid MD      Review of Systems   Constitutional: Negative for chills and fever.   HENT: Negative for congestion, ear pain and sore throat.    Respiratory: Negative for shortness of breath.    Cardiovascular: Negative for chest pain and palpitations.   Gastrointestinal: Positive for diarrhea. Negative for abdominal pain, nausea and vomiting.   Genitourinary: Negative for difficulty urinating.   Musculoskeletal: Negative for back pain.   Skin: Negative for rash.   Neurological: Positive for weakness (Generalized). Negative for headaches.        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        Medication List      You have not been prescribed any medications.         Past Medical History:   Diagnosis Date   • Elevated cholesterol    • Hypertension        Allergies   Allergen Reactions   • Penicillins Itching       Past Surgical History:   Procedure Laterality Date   • KNEE SURGERY         No family history on file.    Social History     Socioeconomic History   • Marital status:    Tobacco Use   • Smoking status: Never Smoker   Substance and Sexual Activity   • Sexual activity: Defer           Objective   Physical Exam  Vitals (The temperature is 99 °F, pulse 104, respirations 18, /91, room air pulse ox 90%.) and nursing note reviewed.   Constitutional:       Comments: Patient is tearful and appears depressed   HENT:      Head: Normocephalic and atraumatic.      Right Ear: Tympanic membrane normal.      Left Ear: Tympanic membrane normal.      Nose: Nose normal.      Mouth/Throat:      Mouth: Mucous membranes are moist.      Pharynx: No oropharyngeal exudate or posterior oropharyngeal erythema.   Eyes:      Extraocular Movements: Extraocular movements intact.      Pupils: Pupils are equal, round, and reactive to light.   Cardiovascular:      Rate and Rhythm: Normal rate and regular rhythm.      Pulses: Normal pulses.      Heart sounds: Normal heart sounds. No murmur heard.    No friction rub. No gallop.   Pulmonary:      Effort: Pulmonary effort is normal.      Breath sounds: Normal breath sounds.   Abdominal:      General: Abdomen is flat. Bowel sounds are normal. There is no distension.      Palpations: There is no mass.      Tenderness: There is no abdominal tenderness. There is no guarding or rebound.      Hernia: No hernia is present.   Musculoskeletal:         General: No swelling. Normal range of motion.      Cervical back: Normal range of motion and neck supple. No rigidity.   Skin:     General: Skin is warm and dry.    Neurological:      General: No focal deficit present.      Mental Status: She is alert and oriented to person, place, and time.      Cranial Nerves: No cranial nerve deficit.      Motor: No weakness.      Coordination: Coordination normal.   Psychiatric:      Comments: Depressed affect         Procedures           ED Course  ED Course as of 05/04/22 1939   Wed May 04, 2022   1829 The laboratory values reviewed by me.  The urine microscopic shows 13-20 white blood cells, trace bacteria.  The ethanol level is 337 mg/dL.  Urine leukocytes are large.  The serum glucose is 124.  The chloride is 94.  The CO2 is 18.  The anion gap is 24.  The laboratory values are otherwise unremarkable [RC]      ED Course User Index  [RC] Fredy Adamson MD      19:31 EDT, 05/04/22:  The EKG was obtained at 1928 read by me at 1929.  The EKG shows sinus tachycardia with a rate of 104.  The [place], QRS, and QT intervals are unremarkable.  There is no ectopy.  There is inferior Q waves.  There is no acute ST elevation or depression.    19:39 EDT, 05/04/22:  The patient's diagnosis of depression, alcohol intoxication, and urinary tract infection were discussed with her.  Patient will be given antibiotic here in the emergency department.  We have consulted the time team to assess her depression and address her alcohol abuse.     23:51 EDT, 05/04/22:  The patient and brother wish to go home tonight.  The patient will be given a prescription for Ceftin for her urinary tract infection.  The plan will be for the brother to take the patient to the [facility] tomorrow for further treatment and evaluation of her depression and alcohol use.  The patient does not have suicidal or homicidal ideation.  All of the brothers and patient's question were answered the patient will be discharged in good condition.                                      MDM    Final diagnoses:   Alcoholic intoxication without complication (HCC)   Acute UTI   Recurrent major depressive  disorder, remission status unspecified (HCC)       ED Disposition  ED Disposition     None          No follow-up provider specified.       Medication List      No changes were made to your prescriptions during this visit.          Fredy Adamson MD  05/05/22 0036

## 2022-05-05 VITALS
WEIGHT: 153.5 LBS | DIASTOLIC BLOOD PRESSURE: 84 MMHG | HEART RATE: 90 BPM | HEIGHT: 62 IN | RESPIRATION RATE: 18 BRPM | SYSTOLIC BLOOD PRESSURE: 130 MMHG | BODY MASS INDEX: 28.25 KG/M2 | OXYGEN SATURATION: 96 % | TEMPERATURE: 99 F

## 2022-05-05 LAB — BACTERIA SPEC AEROBE CULT: NORMAL

## 2022-05-05 NOTE — DISCHARGE INSTRUCTIONS
Follow-up with the Samantha in the morning.  Follow-up with  within 1 week.  Return to the emergency department if you are worse in any way at all.